# Patient Record
Sex: FEMALE | Race: WHITE | Employment: UNEMPLOYED | URBAN - METROPOLITAN AREA
[De-identification: names, ages, dates, MRNs, and addresses within clinical notes are randomized per-mention and may not be internally consistent; named-entity substitution may affect disease eponyms.]

---

## 2018-09-11 ENCOUNTER — DOCUMENTATION ONLY (OUTPATIENT)
Dept: INTERNAL MEDICINE CLINIC | Age: 38
End: 2018-09-11

## 2018-09-11 NOTE — PROGRESS NOTES
Spoke with pt on [id number]  Letting her know the first available for a new pt [facility] and she decide to wait to see dr valiente in October pt stated she is o.k with medication for her leg until then

## 2018-10-05 ENCOUNTER — TELEPHONE (OUTPATIENT)
Dept: INTERNAL MEDICINE CLINIC | Age: 38
End: 2018-10-05

## 2018-10-05 ENCOUNTER — OFFICE VISIT (OUTPATIENT)
Dept: INTERNAL MEDICINE CLINIC | Age: 38
End: 2018-10-05

## 2018-10-05 VITALS
HEIGHT: 64 IN | TEMPERATURE: 99.1 F | SYSTOLIC BLOOD PRESSURE: 134 MMHG | HEART RATE: 98 BPM | OXYGEN SATURATION: 97 % | DIASTOLIC BLOOD PRESSURE: 84 MMHG | WEIGHT: 214 LBS | RESPIRATION RATE: 15 BRPM | BODY MASS INDEX: 36.54 KG/M2

## 2018-10-05 DIAGNOSIS — I10 ESSENTIAL HYPERTENSION: Primary | ICD-10-CM

## 2018-10-05 DIAGNOSIS — Z01.419 VISIT FOR GYNECOLOGIC EXAMINATION: ICD-10-CM

## 2018-10-05 DIAGNOSIS — R60.0 LEG EDEMA, LEFT: ICD-10-CM

## 2018-10-05 DIAGNOSIS — J30.89 ENVIRONMENTAL AND SEASONAL ALLERGIES: ICD-10-CM

## 2018-10-05 RX ORDER — MINERAL OIL
180 ENEMA (ML) RECTAL DAILY
Qty: 90 TAB | Refills: 1 | Status: SHIPPED | OUTPATIENT
Start: 2018-10-05 | End: 2021-11-30 | Stop reason: ALTCHOICE

## 2018-10-05 RX ORDER — ACETAMINOPHEN AND CODEINE PHOSPHATE 120; 12 MG/5ML; MG/5ML
SOLUTION ORAL
COMMUNITY
End: 2018-10-05 | Stop reason: ALTCHOICE

## 2018-10-05 RX ORDER — NORETHINDRONE 0.35 MG/1
TABLET ORAL
COMMUNITY
Start: 2018-09-13

## 2018-10-05 RX ORDER — HYDROCHLOROTHIAZIDE 25 MG/1
25 TABLET ORAL DAILY
Qty: 90 TAB | Refills: 1 | Status: SHIPPED | OUTPATIENT
Start: 2018-10-05 | End: 2019-01-02 | Stop reason: SDUPTHER

## 2018-10-05 RX ORDER — ACETAMINOPHEN 500 MG
1000 TABLET ORAL
COMMUNITY
End: 2022-07-06

## 2018-10-05 NOTE — MR AVS SNAPSHOT
[address] [place] [address], [address] [place] [address] 
[phone] Patient: Katherine Burnett MRN: [id number] :1980 Visit Information Date & Time Provider Department Dept. Phone Encounter #  
 10[facility]5/2018 12:00 [phone] Follow-up Instructions Return in about 6 months (around 2019) for hypertension. Upcoming Health Maintenance Date Due DTaP/Tdap/Td series (1 - Tdap) 2003 PAP AKA CERVICAL CYTOLOGY 2003 Influenza Age 5 to Adult 3/31/2019* *Topic was postponed. The date shown is not the original due date. Allergies as of 10/5/2018  Review Complete On: 10/5/2018 By: Luis Curry Severity Noted Reaction Type Reactions Aspirin High 2010    Other (comments) Heart palpitations Ciprofloxacin High 2010    Rash Codeine High 2010    Nausea and Vomiting Kenalog [Triamcinolone Acetonide] High 2010    Anaphylaxis Zithromax [Azithromycin] High 2010    Rash Current Immunizations  Reviewed on 2015 Name Date Influenza Vaccine  Deferred (Patient Refused) Not reviewed this visit You Were Diagnosed With   
  
 Codes Comments Essential hypertension    -  Primary ICD-10-CM: I10 
ICD-9-CM: 401.9 Leg edema, left     ICD-10-CM: R60.0 ICD-9-CM: 162. 3 Vitals BP Pulse Temp Resp Height(growth percentile) Weight(growth percentile) 134/84 (BP 1 Location: Left arm, BP Patient Position: Sitting) 98 99.1 °F (37.3 °C) (Oral) 15 5' 4\" (1.626 m) 214 lb (97.1 kg) LMP SpO2 Breastfeeding? BMI OB Status Smoking Status (LMP Unknown) 97% No 36.73 kg/m2 Medically Induced Former Smoker Vitals History BMI and BSA Data Body Mass Index Body Surface Area  
 36.73 kg/m 2 2.09 m 2 Preferred Pharmacy Pharmacy Name Phone [facility] [phone] Your Updated Medication List  
  
   
This list is accurate as of 10/5/18  1:05 PM.  Always use your most recent med list.  
  
  
  
  
 fexofenadine 180 mg tablet Commonly known as:  Ronel Deter Take 1 Tab by mouth daily. * hydroCHLOROthiazide 25 mg tablet Commonly known as:  HYDRODIURIL Take 1 Tab by mouth daily. * hydroCHLOROthiazide 25 mg tablet Commonly known as:  HYDRODIURIL Take 1 Tab by mouth daily. CAROLA-BE 0.35 mg Tab Generic drug:  norethindrone Dr. Slime Louise, started 7/2018 TYLENOL EXTRA STRENGTH 500 mg tablet Generic drug:  acetaminophen Take 1,000 mg by mouth every six (6) hours as needed for Pain (At 4PM & 11PM). * Notice: This list has 2 medication(s) that are the same as other medications prescribed for you. Read the directions carefully, and ask your doctor or other care provider to review them with you. Prescriptions Sent to Pharmacy Refills  
 fexofenadine (ALLEGRA) 180 mg tablet 1 Sig: Take 1 Tab by mouth daily. Class: Normal  
 Pharmacy: [facility] Ph #: [phone] Route: Oral  
 hydroCHLOROthiazide (HYDRODIURIL) 25 mg tablet 1 Sig: Take 1 Tab by mouth daily. Class: Normal  
 Pharmacy: [facility] Ph #: [phone] Route: Oral  
 hydroCHLOROthiazide (HYDRODIURIL) 25 mg tablet 1 Sig: Take 1 Tab by mouth daily. Class: Normal  
 Pharmacy: [facility] Ph #: [phone] Route: Oral  
  
We Performed the Following CBC WITH AUTOMATED DIFF [70806 CPT(R)] LIPID PANEL [88596 CPT(R)] METABOLIC PANEL, COMPREHENSIVE [11331 CPT(R)] UA/M W/RFLX CULTURE, ROUTINE [[id number] Custom] Follow-up Instructions Return in about 6 months (around 4/5/2019) for hypertension. Introducing [facility]! [facility] introduces Zoyi patient portal. Now you can access parts of your medical record, email your doctor's office, and request medication refills online. 1. In your internet browser, go to [email or web address] 2. Click on the First Time User? Click Here link in the Sign In box. You will see the New Member Sign Up page. 3. Enter your Zoyi Access Code exactly as it appears below. You will not need to use this code after youve completed the sign-up process. If you do not sign up before the expiration date, you must request a new code. · Zoyi Access Code: [email or web address] Expires: 1/3/2019  1:05 PM 
 
4. Enter the last four digits of your Social Security Number (xxxx) and Date of Birth (mm/dd/yyyy) as indicated and click Submit. You will be taken to the next sign-up page. 5. Create a Zoyi ID. This will be your Zoyi login ID and cannot be changed, so think of one that is secure and easy to remember. 6. Create a Zoyi password. You can change your password at any time. 7. Enter your Password Reset Question and Answer. This can be used at a later time if you forget your password. 8. Enter your e-mail address. You will receive e-mail notification when new information is available in [address] 9. Click Sign Up. You can now view and download portions of your medical record. 10. Click the Download Summary menu link to download a portable copy of your medical information. If you have questions, please visit the Frequently Asked Questions section of the Zoyi website. Remember, Zoyi is NOT to be used for urgent needs. For medical emergencies, dial 911. Now available from your iPhone and Android! Please provide this summary of care documentation to your next provider. Your primary care clinician is listed as Serena Lanza. If you have any questions after today's visit, please call [phone].

## 2018-10-05 NOTE — TELEPHONE ENCOUNTER
Patient in office for visit today 10/5/18 with Dr. Dharmesh Gonzales, signed medical release form for [facility]. Fax sent, confirmation received, and placed to be scanned into chart. [facility]  Phone: [phone]  Fax: [phone]  No provider listed by patient.

## 2018-10-05 NOTE — PROGRESS NOTES
Subjective:  
  
Delmi Weber is a 30 y.o. female who presents today to become established. She has not seen a primary care provider in a year. Primary concern: -leg pain. She has had 2 DVT's in her right leg. Patient states that it was due to use of birth control pills. She has 9 children. 6 in her custody. She states that they all of a disability of some kind. She is getting her progesterone pills for birth control via a web site and interview with a NP online. She was seen in the ER at [facility] a few weeks ago for left leg pain and swelling. She was given hctz 25mg daily. She states that it has helped and it has also helped her control her blood pressure as well. She does not wear compression stockings nor does she watch the salt content in her diet. There is no regular exercise. Patient Active Problem List  
Diagnosis Code  Hypertension complicating pregnancy M22.9  No prenatal care O09.899, Z91.19  
 Previous DVT O22.30, I82.409  PROM (premature rupture of membranes) O42.90  
 Grand multipara Z64.1  Placenta previa antepartum in third trimester O44.03  
 
Current Outpatient Prescriptions Medication Sig Dispense Refill  CAROLA-BE 0.35 mg tab Dr. Aileen Hernandez, started 7/2018  acetaminophen (TYLENOL EXTRA STRENGTH) 500 mg tablet Take 1,000 mg by mouth every six (6) hours as needed for Pain (At 4PM & 11PM).  fexofenadine (ALLEGRA) 180 mg tablet Take 1 Tab by mouth daily. 90 Tab 1  
 hydroCHLOROthiazide (HYDRODIURIL) 25 mg tablet Take 1 Tab by mouth daily. 90 Tab 1  
 hydroCHLOROthiazide (HYDRODIURIL) 25 mg tablet Take 1 Tab by mouth daily. 90 Tab 1 Review of Systems Pertinent items are noted in HPI. Objective:  
 
Visit Vitals  /84 (BP 1 Location: Left arm, BP Patient Position: Sitting)  Pulse 98  Temp 99.1 °F (37.3 °C) (Oral)  Resp 15  Ht 5' 4\" (1.626 m)  Wt 214 lb (97.1 kg)  LMP  (LMP Unknown) Comment: OCPs  SpO2 97%  Breastfeeding No  
 BMI 36.73 kg/m2 General appearance: alert, cooperative, no distress, appears stated age Head: Normocephalic, without obvious abnormality, atraumatic Neck: supple, symmetrical, trachea midline, no adenopathy, no carotid bruit and no JVD Lungs: clear to auscultation bilaterally Heart: regular rate and rhythm, S1, S2 normal, no murmur, click, rub or gallop Abdomen: soft, non-tender. Bowel sounds normal. No masses,  no organomegaly Extremities: extremities normal, atraumatic, no cyanosis or edema Pulses: 2+ and symmetric Assessment/Plan: 1. Essential hypertension 
-will continue use of hctz 25mg daily. -encouraged a low salt diet.  
-check labs at this time. - CBC WITH AUTOMATED DIFF 
- METABOLIC PANEL, COMPREHENSIVE 
- LIPID PANEL 
- UA/M W/RFLX CULTURE, ROUTINE 2. Leg edema, left 
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 
-recommended she wear compression stockings daily.  
 
- METABOLIC PANEL, COMPREHENSIVE 
- UA/M W/RFLX CULTURE, ROUTINE 3. Visit for gynecologic examination 
-recommended she see a gynecologist for evaluation for best birth control for her .  
-referred patient to Dr. Saúl Atwood. [facility] ref [facility] 4. Seasonal allergies 
-refilled her allegra 180mg daily. Orders Placed This Encounter  CBC WITH AUTOMATED DIFF  
 METABOLIC PANEL, COMPREHENSIVE  LIPID PANEL  
 UA/M W/RFLX CULTURE, ROUTINE  
Behzad Rivera OB/GYN ref [facility] Referral Priority:   Routine Referral Type:   Consultation Referral Reason:   Specialty Services Required Referred to Provider:   Pascale Craft MD  
   
   
    
   
    
 fexofenadine TY [facility]) 180 mg tablet Sig: Take 1 Tab by mouth daily. Dispense:  90 Tab Refill:  1  
 hydroCHLOROthiazide (HYDRODIURIL) 25 mg tablet Sig: Take 1 Tab by mouth daily. Dispense:  90 Tab Refill:  1 Follow-up Disposition:  
 
Follow up in 6 months Return if symptoms worsen or fail to improve. Advised patient to call back or return to office if symptoms worsen/change/persist.  
 
Discussed expected course/resolution/complications of diagnosis in detail with patient. Medication risks/benefits/costs/interactions/alternatives discussed with patient. Patient was given an after visit summary which includes diagnoses, current medications, & vitals. Patient expressed understanding with the diagnosis and plan.

## 2018-10-05 NOTE — PROGRESS NOTES
Patient's identity verified with two patient identifiers (name and date of birth). Reviewed record in preparation for visit and have obtained necessary documentation. 1. Have you been to the ER, urgent care clinic since your last visit? Hospitalized since your last visit? Yes, [facility] ER x1mos ago for leg pain/swelling. 2. Have you seen or consulted any other health care providers outside of the [address] [place] [address] since your last visit? Include any pap smears or colon screening. Yes, see above. Chief Complaint Patient presents with  New Patient Prior PCP Dr. Jaqueline Myers, last seen x1yr.  Ear Pain Right ear, radiates into ear, x1wk, Orajel w/ relief & Tylenol but \"puts me to sleep\". Complains of pressure, worse at night, no change w/ laying down. Worse w/ noise, has 6 kids. Denies difficulty hearing. Does not use q-tips.  Knee Pain Right knee, x1mos d/t fall, bruising/swelling/some difficulty walking. Now w/ pain w/ standing or changing positions, denies pain w/ rest.  Complains of bilateral edema of legs, has \"looked like it was going to burst\". Takes HCTZ daily, used to take BID but no change. Not fasting. Health Maintenance Due Topic Date Due  
 DTaP/Tdap/Td series (1 - Tdap) Thinks has had 2014, [facility]. 12/17/2003  PAP AKA CERVICAL CYTOLOGY Patient reports has not had. 12/17/2003  Influenza Age 5 to Adult Patient reports has not had. \"Unable to get\", felt like she was going to fall out. 08/01/2018 Wt Readings from Last 3 Encounters:  
10/05/18 214 lb (97.1 kg) 05/14/15 232 lb (105.2 kg) 01/26/14 237 lb (107.5 kg) Temp Readings from Last 3 Encounters:  
10/05/18 99.1 °F (37.3 °C) (Oral) 05/24/15 98.3 °F (36.8 °C)  
01/29/14 98.1 °F (36.7 °C) BP Readings from Last 3 Encounters:  
10/05/18 134/84  
05/24/15 136/84  
01/29/14 153/88 Pulse Readings from Last 3 Encounters:  
10/05/18 98  
05/24/15 86  
01/29/14 84 Learning Assessment: 
:  
 
Learning Assessment 10/5/2018 PRIMARY LEARNER Patient HIGHEST LEVEL OF EDUCATION - PRIMARY LEARNER  SOME COLLEGE  
BARRIERS PRIMARY LEARNER NONE  
CO-LEARNER CAREGIVER No  
PRIMARY LANGUAGE ENGLISH  
LEARNER PREFERENCE PRIMARY DEMONSTRATION  
  LISTENING  
ANSWERED BY patient RELATIONSHIP SELF Depression Screening: 
:  
 
PHQ over the last two weeks 10/5/2018 Little interest or pleasure in doing things Not at all Feeling down, depressed, irritable, or hopeless More than half the days Total Score PHQ 2 2 Abuse Screening: 
:  
 
Abuse Screening Questionnaire 10/5/2018 Do you ever feel afraid of your partner? Wayne Distance Are you in a relationship with someone who physically or mentally threatens you? Wayne Distance Is it safe for you to go home? Reece Peñaloza Patient is accompanied by  and child (less than 4yo) I have received verbal consent from [employer] to discuss any/all medical information while they are present in the room.

## 2018-10-10 PROBLEM — I10 ESSENTIAL HYPERTENSION: Status: ACTIVE | Noted: 2018-10-10

## 2018-10-10 PROBLEM — I87.2 VENOUS INSUFFICIENCY OF LEFT LEG: Status: ACTIVE | Noted: 2018-10-10

## 2019-06-18 RX ORDER — HYDROCHLOROTHIAZIDE 25 MG/1
25 TABLET ORAL DAILY
Qty: 30 TAB | Refills: 0 | Status: SHIPPED | OUTPATIENT
Start: 2019-06-18 | End: 2019-08-01 | Stop reason: SDUPTHER

## 2019-07-16 RX ORDER — HYDROCHLOROTHIAZIDE 25 MG/1
25 TABLET ORAL DAILY
Qty: 30 TAB | Refills: 0 | OUTPATIENT
Start: 2019-07-16

## 2019-08-01 ENCOUNTER — OFFICE VISIT (OUTPATIENT)
Dept: INTERNAL MEDICINE CLINIC | Age: 39
End: 2019-08-01

## 2019-08-01 VITALS
HEIGHT: 64 IN | RESPIRATION RATE: 16 BRPM | BODY MASS INDEX: 37.32 KG/M2 | HEART RATE: 100 BPM | WEIGHT: 218.6 LBS | TEMPERATURE: 97.7 F | OXYGEN SATURATION: 99 % | SYSTOLIC BLOOD PRESSURE: 110 MMHG | DIASTOLIC BLOOD PRESSURE: 80 MMHG

## 2019-08-01 DIAGNOSIS — J02.9 ACUTE PHARYNGITIS, UNSPECIFIED ETIOLOGY: Primary | ICD-10-CM

## 2019-08-01 DIAGNOSIS — D64.9 ANEMIA, UNSPECIFIED TYPE: ICD-10-CM

## 2019-08-01 DIAGNOSIS — R00.0 TACHYCARDIA: ICD-10-CM

## 2019-08-01 DIAGNOSIS — R60.9 EDEMA, UNSPECIFIED TYPE: ICD-10-CM

## 2019-08-01 DIAGNOSIS — I87.2 VENOUS INSUFFICIENCY OF LEFT LEG: ICD-10-CM

## 2019-08-01 DIAGNOSIS — L67.8 ABNORMAL FACIAL HAIR: ICD-10-CM

## 2019-08-01 DIAGNOSIS — B07.0 PLANTAR WART OF LEFT FOOT: ICD-10-CM

## 2019-08-01 DIAGNOSIS — B37.2 CANDIDAL INTERTRIGO: ICD-10-CM

## 2019-08-01 RX ORDER — HYDROCHLOROTHIAZIDE 25 MG/1
25 TABLET ORAL DAILY
Qty: 30 TAB | Refills: 3 | Status: SHIPPED | OUTPATIENT
Start: 2019-08-01 | End: 2021-08-20 | Stop reason: DRUGHIGH

## 2019-08-01 RX ORDER — AMOXICILLIN 875 MG/1
875 TABLET, FILM COATED ORAL 2 TIMES DAILY
Qty: 14 TAB | Refills: 0 | Status: SHIPPED | OUTPATIENT
Start: 2019-08-01 | End: 2019-08-08

## 2019-08-01 RX ORDER — NYSTATIN 100000 [USP'U]/G
POWDER TOPICAL
Qty: 60 G | Refills: 3 | Status: SHIPPED | OUTPATIENT
Start: 2019-08-01 | End: 2021-07-27 | Stop reason: ALTCHOICE

## 2019-08-01 NOTE — PROGRESS NOTES
Chief Complaint   Patient presents with    Medication Refill    Other     right ear popping    Rash     under breast      Reviewed record in preparation for visit and have obtained necessary documentation. Identified pt with two pt identifiers(name and ). Health Maintenance Due   Topic    DTaP/Tdap/Td series (1 - Tdap)    PAP AKA CERVICAL CYTOLOGY     Influenza Age 5 to Adult          Chief Complaint   Patient presents with    Medication Refill    Other     right ear popping    Rash     under breast         Wt Readings from Last 3 Encounters:   19 218 lb 9.6 oz (99.2 kg)   10/05/18 214 lb (97.1 kg)   05/14/15 232 lb (105.2 kg)     Temp Readings from Last 3 Encounters:   19 97.7 °F (36.5 °C) (Oral)   10/05/18 99.1 °F (37.3 °C) (Oral)   05/24/15 98.3 °F (36.8 °C)     BP Readings from Last 3 Encounters:   19 110/80   10/05/18 134/84   05/24/15 136/84     Pulse Readings from Last 3 Encounters:   19 100   10/05/18 98   05/24/15 86           Learning Assessment:  :     Learning Assessment 10/5/2018   PRIMARY LEARNER Patient   HIGHEST LEVEL OF EDUCATION - PRIMARY LEARNER  SOME COLLEGE   BARRIERS PRIMARY LEARNER NONE   CO-LEARNER CAREGIVER No   PRIMARY LANGUAGE ENGLISH   LEARNER PREFERENCE PRIMARY DEMONSTRATION     LISTENING   ANSWERED BY patient   RELATIONSHIP SELF       Depression Screening:  :     3 most recent PHQ Screens 10/5/2018   Little interest or pleasure in doing things Not at all   Feeling down, depressed, irritable, or hopeless More than half the days   Total Score PHQ 2 2       Fall Risk Assessment:  :     No flowsheet data found. Abuse Screening:  :     Abuse Screening Questionnaire 10/5/2018   Do you ever feel afraid of your partner? N   Are you in a relationship with someone who physically or mentally threatens you? N   Is it safe for you to go home?  Y       Coordination of Care Questionnaire:  :     1) Have you been to an emergency room, urgent care clinic since your last visit? no   Hospitalized since your last visit? no             2) Have you seen or consulted any other health care providers outside of [address] [place] [address] since your last visit? yes  (Include any pap smears or colon screenings in this section.)    3) Do you have an Advance Directive on file? no    4) Are you interested in receiving information on Advance Directives? NO      Patient is accompanied by spouse I have received verbal consent from Glenny Vasquez to discuss any/all medical information while they are present in the room. Reviewed record  In preparation for visit and have obtained necessary documentation.

## 2019-08-01 NOTE — PROGRESS NOTES
42 yo female in for 6 mo HTN f/u and medication refill. She takes HCTZ, but takes this for LLE swelling. She also has a rash under both breasts, and is having popping in her R ear. She has a sore spot on bottom of L foot for months making it painful to weight bear. She has 6 children all of whom are intellectually disabled. They are ages 3 to 13. She feels very tired and sleepy during the day. 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 PE: Overweight WF accompanied by her    She has a significant amount of lower facial hair   Weight - 218 lb (down from 232 in 2015)   BP - 110/80   Phar - reg   Neck - L>R tonsillar node   Ears - TMs dull   Lungs - clear   Heart - RRR @ 100/M   Under breasts - redness   L foot - large plantar wart over base of 4th tarsal    Imp: Acute pharyngitis   Chronic LLE venous insufficiency   Tachycardia   Intertrigo   Plantar wart L foot   Facial hair    Plan: She will have labs drawn at [facility]   Amoxicillin, Mucinex and good hydration   Refill HCTZ   Nystatin powder   Suggested OTC tx for Plantar Wart (she will check with Pharmacist); she will let us know if she would like to see a Podiatrist   Suggest elevation of LEs when sitting   She is not a salt user   See Dr. Janessa Dietz in 6 mos   Encouraged her to establish with GYN provider as previously suggested by Dr. Janessa Dietz (she currently orders her OC through mail)  ________________________________  Expected course of current diagnosed problem(s) as well as expected progression and possible complications, and desired follow up with provider are discussed with patient. Patient is encouraged to be back in touch with any questions or concerns. Patient expresses understanding of plan of care. Patient is given AVS which includes diagnoses, current medications, vitals.

## 2019-08-01 NOTE — PATIENT INSTRUCTIONS
Plantar Wart treatment - topical salicylic acid - ask your pharmacist  ______________________________________    1. Mucinex (Guaifenesen) plain-Blue Box 600 mg. Take one twice daily with full glass water   Take for 10 days    2. Saline Nasal Spray - use liberally to flush post-nasal area; use as many times a day as desired. Keep spraying with head tilted back until you feel need to swallow    3. Drink lots of fluids (mainly water) to keep mucus thinner    4. If needed, for cough, we recommend Delsym cough syrup    5.  Decongestants should not be used as they actually contribute to overdrying/thickening of the mucus, and can raise the BP and overstimulate the heart

## 2021-07-27 ENCOUNTER — OFFICE VISIT (OUTPATIENT)
Dept: INTERNAL MEDICINE CLINIC | Age: 41
End: 2021-07-27
Payer: MEDICAID

## 2021-07-27 VITALS
DIASTOLIC BLOOD PRESSURE: 80 MMHG | WEIGHT: 212 LBS | RESPIRATION RATE: 16 BRPM | OXYGEN SATURATION: 99 % | TEMPERATURE: 98.2 F | BODY MASS INDEX: 36.19 KG/M2 | SYSTOLIC BLOOD PRESSURE: 143 MMHG | HEART RATE: 98 BPM | HEIGHT: 64 IN

## 2021-07-27 DIAGNOSIS — I10 ESSENTIAL HYPERTENSION: ICD-10-CM

## 2021-07-27 DIAGNOSIS — I87.2 VENOUS INSUFFICIENCY OF LEFT LEG: ICD-10-CM

## 2021-07-27 DIAGNOSIS — I82.409 DEEP VENOUS EMBOLISM AND THROMBOSIS (HCC): Primary | ICD-10-CM

## 2021-07-27 DIAGNOSIS — I72.8 SPLENIC ARTERY ANEURYSM (HCC): ICD-10-CM

## 2021-07-27 PROCEDURE — 99214 OFFICE O/P EST MOD 30 MIN: CPT | Performed by: INTERNAL MEDICINE

## 2021-07-27 RX ORDER — HYDROCHLOROTHIAZIDE 12.5 MG/1
12.5 TABLET ORAL DAILY
Qty: 30 TABLET | Refills: 1 | Status: SHIPPED | OUTPATIENT
Start: 2021-07-27 | End: 2021-08-20 | Stop reason: DRUGHIGH

## 2021-07-27 NOTE — PATIENT INSTRUCTIONS
Continue Eliquis 5 mg twice daily  Leg elevation  Compression hose   HCTZ 12.5 mg daily for blood pressure and swelling  Tylenol 2 tabs every 8 hours as needed for pain    Labs in 2 weeks  follow up in 2 weeks with Wayne Amaral MD

## 2021-07-27 NOTE — PROGRESS NOTES
Verified name and birth date for privacy precautions. Chart reviewed in preparation for today's visit. Chief Complaint   Patient presents with   [facility]aud[facility] ED Follow-up          Health Maintenance Due   Topic    Hepatitis C Screening     COVID-19 Vaccine (1)    DTaP/Tdap/Td series (1 - Tdap)    PAP AKA CERVICAL CYTOLOGY          Wt Readings from Last 3 Encounters:   07/27/21 212 lb (96.2 kg)   08/01/19 218 lb 9.6 oz (99.2 kg)   10/05/18 214 lb (97.1 kg)     Temp Readings from Last 3 Encounters:   07/27/21 98.2 °F (36.8 °C) (Temporal)   08/01/19 97.7 °F (36.5 °C) (Oral)   10/05/18 99.1 °F (37.3 °C) (Oral)     BP Readings from Last 3 Encounters:   07/27/21 (!) 143/80   08/01/19 110/80   10/05/18 134/84     Pulse Readings from Last 3 Encounters:   07/27/21 98   08/01/19 100   10/05/18 98         Learning Assessment:  :     Learning Assessment 8/1/2019 10/5/2018   PRIMARY LEARNER Patient Patient   HIGHEST LEVEL OF EDUCATION - PRIMARY LEARNER  SOME COLLEGE SOME COLLEGE   BARRIERS PRIMARY LEARNER NONE NONE   CO-LEARNER CAREGIVER - No   PRIMARY LANGUAGE ENGLISH ENGLISH   LEARNER PREFERENCE PRIMARY DEMONSTRATION DEMONSTRATION     - LISTENING   ANSWERED BY patient patient   RELATIONSHIP SELF SELF       Depression Screening:  :     3 most recent PHQ Screens 7/27/2021   Little interest or pleasure in doing things Not at all   Feeling down, depressed, irritable, or hopeless Not at all   Total Score PHQ 2 0       Fall Risk Assessment:  :     No flowsheet data found. Abuse Screening:  :     Abuse Screening Questionnaire 7/27/2021 8/1/2019 10/5/2018   Do you ever feel afraid of your partner? N N N   Are you in a relationship with someone who physically or mentally threatens you? N N N   Is it safe for you to go home?  Viki Mann

## 2021-07-27 NOTE — PROGRESS NOTES
Transition of Care   ER Evaluation: 7/22/21  [facility]  Discharge Diagnosis: DVT left, PE, splenic artery aneurysm     Patient is a 48 yo woman with a history of DVT of her left leg off anticoagulants, hypertension, venous insufficiency who was seen [facility] ER on 7/22/21 with right leg pain. Symptoms began 1-2 months ago after a fall, but RLE pain increased several days prior to admission. Venous Duplex showed acute occlusive DVT involving the right femoral vein, right posterior tibial veins, right peroneal veins and right gastrocnemius veins. 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 Primary aneurysm measures 1.8 cm. No evidence of leak. Previous  history of DVT left 2008, 2012   Patient was discharge on Eliquis 10 mg bid x 7 days, then 5 mg bid. Patient currently taking Progesterone only OCP  She continues to have RLE pain, but it is somewhat improved. She also complains o chronic LLE swelling by the end of the day. She has been advised to use compression hose, but could not afford the cost. She has not checked to see if she has coverage recently. Patient has taken HCTZ in the past, but has not taken x > 1 year. Patient denies shortness of breath, but does have some pain left anterior chest with deep inspiration. She denies cough or hemoptysis. Patient Active Problem List   Diagnosis Code    Previous DVT O22.30    Essential hypertension I10    Venous insufficiency of left leg I87.2     Current Outpatient Medications on File Prior to Visit   Medication Sig Dispense Refill    apixaban (Eliquis) 5 mg tablet Take 5 mg by mouth two (2) times a day.       CAROLA-BE 0.35 mg tab Dr. Alfredo Main, started 7/2018      acetaminophen (TYLENOL EXTRA STRENGTH) 500 mg tablet Take 1,000 mg by mouth every six (6) hours as needed for Pain (At 4PM & 11PM).      hydroCHLOROthiazide (HYDRODIURIL) 25 mg tablet Take 1 Tab by mouth daily. Need office visit for further refills 30 Tab 3    [DISCONTINUED] nystatin (MYCOSTATIN) powder After washing and drying involved areas, apply powder liberally 2-4 times a day until rash is gone (Patient not taking: Reported on 7/27/2021) 60 g 3    fexofenadine (ALLEGRA) 180 mg tablet Take 1 Tab by mouth daily. 90 Tab 1     No current facility-administered medications on file prior to visit. Visit Vitals  BP (!) 143/80 (BP 1 Location: Left arm, BP Patient Position: Sitting, BP Cuff Size: Large adult)   Pulse 98   Temp 98.2 °F (36.8 °C) (Temporal)   Resp 16   Ht 5' 4\" (1.626 m)   Wt 212 lb (96.2 kg)   LMP 07/20/2021   SpO2 99%   BMI 36.39 kg/m²     Patient is in no apparent distress   Heart[de-identified] normal rate, regular rhythm, normal S1, S2, no murmurs, rubs, clicks or gallops. Chest: clear to auscultation, no wheezes, rales or rhonchi,   Extremities: right calf tenderness, without erythema, 1+ bilateral edema    Assessment/Plan:    Deep vein thrombosis/Pulmonary Embolus, recurrent. Patient should remain on anticoagulation. Continue Eliquis 5 mg bid after she completes the 7 day 10 bid dosing. Edema/Venous insufficiency: HCTZ 12.5 mg every day  follow up BMP 2 weeks  Compression hose 20-30 mm prescription provided    HTN: as above HCTZ 12.5 every day. follow up Demetrice Gregg MD     Splenic artery aneurysm: will refer to Vascular Surgery for evaluation per Radiology recommendation.

## 2021-07-28 ENCOUNTER — TELEPHONE (OUTPATIENT)
Dept: INTERNAL MEDICINE CLINIC | Age: 41
End: 2021-07-28

## 2021-07-29 ENCOUNTER — TELEPHONE (OUTPATIENT)
Dept: INTERNAL MEDICINE CLINIC | Age: 41
End: 2021-07-29

## 2021-07-29 NOTE — TELEPHONE ENCOUNTER
7/29/2021 spoke with patient. - she was seen by Dr. Zelda Engel for Transition of Care . Was seen in [facility] ER for DVT. She was discharged with eliquis. She was also noted to have a splenic artery aneurysm and was referred to Vascular surgery. She states that she has an appointment to see Dr. Zuleika Li next week.     -she states that the hctz is making her sleepy. I recommended taking it at bedtime.    -she states compliance with use of her eliquis.

## 2021-07-29 NOTE — TELEPHONE ENCOUNTER
----- Message from Therese Pete MD sent at 7/27/2021  5:07 PM EDT -----  Regarding: abnormal CT Angiogram  Patient seen today for hospital follow up DVT/PE. Review of CTA from hospital record, after patient left, showed incidental splenic artery aneurysm. Radiology recommended Vascular Surgery evaluation. Please call patient and refer her to [facility] (Dr. Dee Chu, Dr. Ce Man).  (Patient is not on MyChart)    Thanks

## 2021-07-30 ENCOUNTER — TELEPHONE (OUTPATIENT)
Dept: INTERNAL MEDICINE CLINIC | Age: 41
End: 2021-07-30

## 2021-07-30 NOTE — TELEPHONE ENCOUNTER
Called patient advised that Dr. Kiara Del Rosario wanted to see her in 2 weeks, canceled her appt with NP, Olivia Benitez and scheduled her with Dr. Kiara Del Rosario on 8/20/21.

## 2021-07-30 NOTE — TELEPHONE ENCOUNTER
----- Message from Gabriel Taylor MD sent at 7/29/2021  4:49 PM EDT -----  Regarding: RE: abnormal CT Angiogram  Please switch her appointment with Blair Haas to with me in 2 weeks if possible. Thanks  Serena   ----- Message -----  From: Dave Lou  Sent: 7/29/2021   7:20 AM EDT  To: Gabriel Taylor MD, Karyn Sheth LPN  Subject: FW: abnormal CT Angiogram                        Tanner Wagner- has this pt been called- please see below? Also she has a fu in 1 month with Blair Haas, I think it is more appropriate for her to fu with Dr. Ced Sanchez, can you please make sure this is changed. Thank you  ----- Message -----  From: Stephane Malone MD  Sent: 7/27/2021   5:07 PM EDT  To: Gabriel Taylor MD, [facility] Three Pool  Subject: abnormal CT Angiogram                            Patient seen today for hospital follow up DVT/PE. Review of CTA from hospital record, after patient left, showed incidental splenic artery aneurysm. Radiology recommended Vascular Surgery evaluation. Please call patient and refer her to [facility] (Dr. Boris Quintero, Dr. Herberth Langley).  (Patient is not on MyChart)    Thanks

## 2021-07-30 NOTE — TELEPHONE ENCOUNTER
----- Message from Lei Navarro MD sent at 7/29/2021  4:49 PM EDT -----  Regarding: RE: abnormal CT Angiogram  Please switch her appointment with Rayray Stanley to with me in 2 weeks if possible. Thanks  Serena   ----- Message -----  From: Maxim Underwood  Sent: 7/29/2021   7:20 AM EDT  To: Lei Navarro MD, Buck Lacrosse, LPN  Subject: FW: abnormal CT Angiogram                        Tanner Wagner- has this pt been called- please see below? Also she has a fu in 1 month with Rayray Stanley, I think it is more appropriate for her to fu with Dr. Burak Keller, can you please make sure this is changed. Thank you  ----- Message -----  From: Moreno Swartz MD  Sent: 7/27/2021   5:07 PM EDT  To: Lei Navarro MD, Sravan Hamm Team Three Pool  Subject: abnormal CT Angiogram                            Patient seen today for hospital follow up DVT/PE. Review of CTA from hospital record, after patient left, showed incidental splenic artery aneurysm. Radiology recommended Vascular Surgery evaluation. Please call patient and refer [facility] to [facility] (Dr. Josiah Lau, Dr. Willene Sicard).  (Patient is not on MyChart)    Thanks

## 2021-08-04 ENCOUNTER — TRANSCRIBE ORDER (OUTPATIENT)
Dept: SCHEDULING | Age: 41
End: 2021-08-04

## 2021-08-04 DIAGNOSIS — I72.8 ANEURYSM OF SUBCLAVIAN ARTERY (HCC): Primary | ICD-10-CM

## 2021-08-20 ENCOUNTER — OFFICE VISIT (OUTPATIENT)
Dept: INTERNAL MEDICINE CLINIC | Age: 41
End: 2021-08-20
Payer: MEDICAID

## 2021-08-20 VITALS
TEMPERATURE: 97.5 F | RESPIRATION RATE: 16 BRPM | DIASTOLIC BLOOD PRESSURE: 83 MMHG | HEIGHT: 64 IN | WEIGHT: 222 LBS | OXYGEN SATURATION: 99 % | SYSTOLIC BLOOD PRESSURE: 130 MMHG | HEART RATE: 80 BPM | BODY MASS INDEX: 37.9 KG/M2

## 2021-08-20 DIAGNOSIS — Z86.718 HISTORY OF DVT OF LOWER EXTREMITY: ICD-10-CM

## 2021-08-20 DIAGNOSIS — Z86.711 HISTORY OF PULMONARY EMBOLISM: ICD-10-CM

## 2021-08-20 DIAGNOSIS — I87.2 VENOUS INSUFFICIENCY OF LEFT LEG: ICD-10-CM

## 2021-08-20 DIAGNOSIS — I10 ESSENTIAL HYPERTENSION: Primary | ICD-10-CM

## 2021-08-20 DIAGNOSIS — I72.8 ANEURYSM OF SPLENIC ARTERY (HCC): ICD-10-CM

## 2021-08-20 PROCEDURE — 99214 OFFICE O/P EST MOD 30 MIN: CPT | Performed by: INTERNAL MEDICINE

## 2021-08-20 RX ORDER — HYDROCHLOROTHIAZIDE 12.5 MG/1
12.5 TABLET ORAL DAILY
Qty: 30 TABLET | Refills: 5 | Status: SHIPPED | OUTPATIENT
Start: 2021-08-20 | End: 2021-10-05

## 2021-08-20 NOTE — PROGRESS NOTES
Verified name and birth date for privacy precautions. Chart reviewed in preparation for today's visit. Chief Complaint   Patient presents with    ED Follow-up     2nd follow up          Health Maintenance Due   Topic    Hepatitis C Screening     COVID-19 Vaccine (1)    DTaP/Tdap/Td series (1 - Tdap)    PAP AKA CERVICAL CYTOLOGY          Wt Readings from Last 3 Encounters:   08/20/21 222 lb (100.7 kg)   07/27/21 212 lb (96.2 kg)   08/01/19 218 lb 9.6 oz (99.2 kg)     Temp Readings from Last 3 Encounters:   08/20/21 97.5 °F (36.4 °C) (Temporal)   07/27/21 98.2 °F (36.8 °C) (Temporal)   08/01/19 97.7 °F (36.5 °C) (Oral)     BP Readings from Last 3 Encounters:   08/20/21 130/83   07/27/21 (!) 143/80   08/01/19 110/80     Pulse Readings from Last 3 Encounters:   08/20/21 80   07/27/21 98   08/01/19 100         Learning Assessment:  :     Learning Assessment 8/1/2019 10/5/2018   PRIMARY LEARNER Patient Patient   HIGHEST LEVEL OF EDUCATION - PRIMARY LEARNER  SOME COLLEGE SOME COLLEGE   BARRIERS PRIMARY LEARNER NONE NONE   CO-LEARNER CAREGIVER - No   PRIMARY LANGUAGE ENGLISH ENGLISH   LEARNER PREFERENCE PRIMARY DEMONSTRATION DEMONSTRATION     - LISTENING   ANSWERED BY patient patient   RELATIONSHIP SELF SELF       Depression Screening:  :     3 most recent PHQ Screens 8/20/2021   Little interest or pleasure in doing things Not at all   Feeling down, depressed, irritable, or hopeless Not at all   Total Score PHQ 2 0       Fall Risk Assessment:  :     No flowsheet data found. Abuse Screening:  :     Abuse Screening Questionnaire 8/20/2021 7/27/2021 8/1/2019 10/5/2018   Do you ever feel afraid of your partner? N N N N   Are you in a relationship with someone who physically or mentally threatens you? N N N N   Is it safe for you to go home?  Ting Magallanes

## 2021-08-20 NOTE — PROGRESS NOTES
Assessment/Plan:     1. Essential hypertension  -blood pressure is in control with hctz 12.5mg daily. Use of medication reviewed with patient. She is compliant with use. - CBC WITH AUTOMATED DIFF  - METABOLIC PANEL, COMPREHENSIVE  - LIPID PANEL    2. History of DVT of lower extremity  -recurrent  -anticoagulated on eliquis 5mg bid. Medication use reviewed with patient. She has been compliant with use. 3. History of pulmonary embolism  -anticoagulated now with eliquis 5mg bid. 4. Venous insufficiency of left leg  -encouraged her use of compression stockings daily. May take off when sleeping at night. 5. BMI 38.0-38.9,adult  -need to work on diet modification.     - HEMOGLOBIN A1C WITH EAG     6. Splenic aneurysm  -has been seen by vascular surgery. Will contact Dr. Titi Etienne for notes. Orders Placed This Encounter    CBC WITH AUTOMATED DIFF    METABOLIC PANEL, COMPREHENSIVE    LIPID PANEL    HEMOGLOBIN A1C WITH EAG    apixaban (Eliquis) 5 mg tablet     Sig: Take 1 Tablet by mouth two (2) times a day. Dispense:  60 Tablet     Refill:  5    hydroCHLOROthiazide (HYDRODIURIL) 12.5 mg tablet     Sig: Take 1 Tablet by mouth daily. Dispense:  30 Tablet     Refill:  5                         Follow-up Disposition:     Follow up 4 months              Subjective:      Walter Arellano is a 47 y.o. female who presents today for follow up of her hypertension, recurrent DVT, and PE. Since last visit:  -patient diagnosed with recurrent DVT in right femoral vein on 7/22/21. CTA also showed positive for PE. Patient was discharged on Eliquis. -hctz was restarted on 7/27/21 for hypertension and venous insufficiency. She had stopped this medication on her own prior.     -she also was referred to vascular surgery for evaluation of a splenic artery aneurysm. - had evaluation with Dr. Titi Etienne. She states that he ordered another CT of her abdomen and pelvis.   It is scheduled.     -taking her medications. She has been working on compliants with use.  -not wearing her compression stockings. Still has significant swelling in lower legs. Patient Care Team:  -Dr. Symone Johnson, vascular surgeon       Objective: Wt Readings from Last 3 Encounters:   07/27/21 212 lb (96.2 kg)   08/01/19 218 lb 9.6 oz (99.2 kg)   10/05/18 214 lb (97.1 kg)     BP Readings from Last 3 Encounters:   07/27/21 (!) 143/80   08/01/19 110/80   10/05/18 134/84     Visit Vitals  /83 (BP 1 Location: Left arm, BP Patient Position: Sitting, BP Cuff Size: Large adult)   Pulse 80   Temp 97.5 °F (36.4 °C) (Temporal)   Resp 16   Ht 5' 4\" (1.626 m)   Wt 222 lb (100.7 kg)   LMP 08/13/2021   SpO2 99%   BMI 38.11 kg/m²     General appearance: alert, cooperative, no distress, appears stated age  Head: Normocephalic, without obvious abnormality, atraumatic  Neck: supple, symmetrical, trachea midline, no adenopathy, no carotid bruit and no JVD  Lungs: clear to auscultation bilaterally  Heart: regular rate and rhythm, S1, S2 normal, no murmur, click, rub or gallop  Extremities: varicose veins noted, edema 2+ bilateral lower leg from ankle to midshin              Disclaimer:  Return if symptoms worsen or fail to improve. Advised patient to call back or return to office if symptoms worsen/change/persist.     Discussed expected course/resolution/complications of diagnosis in detail with patient. Medication risks/benefits/costs/interactions/alternatives discussed with patient. Patient was given an after visit summary which includes diagnoses, current medications, & vitals. Patient expressed understanding with the diagnosis and plan.

## 2021-08-21 LAB
ALBUMIN SERPL-MCNC: 4.4 G/DL (ref 3.8–4.8)
ALBUMIN/GLOB SERPL: 1.6 {RATIO} (ref 1.2–2.2)
ALP SERPL-CCNC: 112 IU/L (ref 48–121)
ALT SERPL-CCNC: 23 IU/L (ref 0–32)
AST SERPL-CCNC: 17 IU/L (ref 0–40)
BASOPHILS # BLD AUTO: 0.1 X10E3/UL (ref 0–0.2)
BASOPHILS NFR BLD AUTO: 1 %
BILIRUB SERPL-MCNC: 0.3 MG/DL (ref 0–1.2)
BUN SERPL-MCNC: 18 MG/DL (ref 6–20)
BUN/CREAT SERPL: 24 (ref 9–23)
CALCIUM SERPL-MCNC: 9.6 MG/DL (ref 8.7–10.2)
CHLORIDE SERPL-SCNC: 105 MMOL/L (ref 96–106)
CHOLEST SERPL-MCNC: 148 MG/DL (ref 100–199)
CO2 SERPL-SCNC: 26 MMOL/L (ref 20–29)
CREAT SERPL-MCNC: 0.74 MG/DL (ref 0.57–1)
EOSINOPHIL # BLD AUTO: 0.1 X10E3/UL (ref 0–0.4)
EOSINOPHIL NFR BLD AUTO: 1 %
ERYTHROCYTE [DISTWIDTH] IN BLOOD BY AUTOMATED COUNT: 12.7 % (ref 11.7–15.4)
EST. AVERAGE GLUCOSE BLD GHB EST-MCNC: 114 MG/DL
GLOBULIN SER CALC-MCNC: 2.8 G/DL (ref 1.5–4.5)
GLUCOSE SERPL-MCNC: 88 MG/DL (ref 65–99)
HBA1C MFR BLD: 5.6 % (ref 4.8–5.6)
HCT VFR BLD AUTO: 41.3 % (ref 34–46.6)
HDLC SERPL-MCNC: 49 MG/DL
HGB BLD-MCNC: 13.7 G/DL (ref 11.1–15.9)
IMM GRANULOCYTES # BLD AUTO: 0 X10E3/UL (ref 0–0.1)
IMM GRANULOCYTES NFR BLD AUTO: 0 %
LDLC SERPL CALC-MCNC: 90 MG/DL (ref 0–99)
LYMPHOCYTES # BLD AUTO: 1.3 X10E3/UL (ref 0.7–3.1)
LYMPHOCYTES NFR BLD AUTO: 19 %
MCH RBC QN AUTO: 28.5 PG (ref 26.6–33)
MCHC RBC AUTO-ENTMCNC: 33.2 G/DL (ref 31.5–35.7)
MCV RBC AUTO: 86 FL (ref 79–97)
MONOCYTES # BLD AUTO: 0.4 X10E3/UL (ref 0.1–0.9)
MONOCYTES NFR BLD AUTO: 6 %
NEUTROPHILS # BLD AUTO: 4.9 X10E3/UL (ref 1.4–7)
NEUTROPHILS NFR BLD AUTO: 73 %
PLATELET # BLD AUTO: 212 X10E3/UL (ref 150–450)
POTASSIUM SERPL-SCNC: 4.4 MMOL/L (ref 3.5–5.2)
PROT SERPL-MCNC: 7.2 G/DL (ref 6–8.5)
RBC # BLD AUTO: 4.8 X10E6/UL (ref 3.77–5.28)
SODIUM SERPL-SCNC: 144 MMOL/L (ref 134–144)
TRIGL SERPL-MCNC: 41 MG/DL (ref 0–149)
VLDLC SERPL CALC-MCNC: 9 MG/DL (ref 5–40)
WBC # BLD AUTO: 6.7 X10E3/UL (ref 3.4–10.8)

## 2021-09-09 ENCOUNTER — HOSPITAL ENCOUNTER (OUTPATIENT)
Dept: CT IMAGING | Age: 41
Discharge: HOME | End: 2021-09-09
Attending: SURGERY
Payer: MEDICAID

## 2021-09-09 DIAGNOSIS — I72.8 ANEURYSM OF SUBCLAVIAN ARTERY (HCC): ICD-10-CM

## 2021-09-09 PROCEDURE — 74174 CTA ABD&PLVS W/CONTRAST: CPT

## 2021-09-09 PROCEDURE — 74011000636 HC RX REV CODE- 636: Performed by: RADIOLOGY

## 2021-09-09 RX ADMIN — IOPAMIDOL 100 ML: 755 INJECTION, SOLUTION INTRAVENOUS at 20:36

## 2021-09-23 ENCOUNTER — TRANSCRIBE ORDER (OUTPATIENT)
Dept: REGISTRATION | Age: 41
End: 2021-09-23

## 2021-09-23 DIAGNOSIS — Z01.812 PRE-PROCEDURE LAB EXAM: Primary | ICD-10-CM

## 2021-11-03 ENCOUNTER — TRANSCRIBE ORDER (OUTPATIENT)
Dept: REGISTRATION | Age: 41
End: 2021-11-03

## 2021-11-03 DIAGNOSIS — Z01.812 PRE-PROCEDURE LAB EXAM: Primary | ICD-10-CM

## 2021-11-10 ENCOUNTER — TELEPHONE (OUTPATIENT)
Dept: INTERNAL MEDICINE CLINIC | Age: 41
End: 2021-11-10

## 2021-11-10 NOTE — TELEPHONE ENCOUNTER
Message from patient today, 11/10/2021 -     Comments:  Dr. Luh Roberts wants me to get 3 shots pneumonia, Meningitis, and Haemophilus influenza. My spleen is not damaged or being taken out. They are just doing embolization or stent graphing of the artery that goes to the spleen. Do I really need these shots. Also if I have to take them I did not do well with flu shot it made me want to fall out and I was out of it for 2 or 3 days. Would like an opinion on it and if I do have to take it. I rather take it at the office      Plan:  Discussed with patient that she should get the vaccines as recommended by Dr. Luh Roberts, vascular surgeon. 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 I informed her that there is a flu vaccine that is made without egg contact. She will contact her pharmacy for these immunizations.

## 2021-11-29 ENCOUNTER — HOSPITAL ENCOUNTER (OUTPATIENT)
Dept: PREADMISSION TESTING | Age: 41
Discharge: HOME | End: 2021-11-29

## 2021-11-30 ENCOUNTER — VIRTUAL VISIT (OUTPATIENT)
Dept: INTERNAL MEDICINE CLINIC | Age: 41
End: 2021-11-30
Payer: MEDICAID

## 2021-11-30 DIAGNOSIS — J06.9 VIRAL URI: Primary | ICD-10-CM

## 2021-11-30 DIAGNOSIS — R05.9 COUGH: ICD-10-CM

## 2021-11-30 DIAGNOSIS — R09.81 NASAL CONGESTION: ICD-10-CM

## 2021-11-30 PROCEDURE — 99213 OFFICE O/P EST LOW 20 MIN: CPT | Performed by: NURSE PRACTITIONER

## 2021-11-30 NOTE — PROGRESS NOTES
Kimo Stewart is a 47 y.o. female who was seen by synchronous (real-time) audio-video technology on 11/30/2021. Assessment & Plan:   Below is the assessment and plan developed based on review of pertinent history, physical exam (if applicable), labs, studies, and medications. 1. Viral URI  2. Nasal congestion  3. Cough    Advised covid and flu testing at urgent care  Hydrate  Add mucinex  Tylenol for chills/body aches  I spent at least 16 minutes on this visit with this established patient. ([id number])  Subjective:   Kimo Stewart was seen for Cough and Nasal Congestion      Patient reports cough and congestion x 5 days. She felt very fatigued the first 2 days, but that has improved. She notes post-nasal drip. She states everyone in her household has been experiencing similar symptoms. No one has been tested for covid or flu. She has not received vaccines for covid or flu. She reports chills at night, but tylenol helps. No known fever, but believes she gets one at night. Prior to Admission medications    Medication Sig Start Date End Date Taking? Authorizing Provider   hydroCHLOROthiazide (HYDRODIURIL) 12.5 mg tablet Take 1 tablet by mouth once daily 10/5/21   Willa Adam MD   apixaban (Eliquis) 5 mg tablet Take 1 Tablet by mouth two (2) times a day. 8/20/21   Willa Adam MD   Comp Stocking,Knee,Regular,Sml misc 1 Device by Does Not Apply route daily. Indications: leg swelling post blood clots 7/27/21   DislerMichael MD   CAROLA-BE 0.35 mg tab Dr. Judith Nunez, started 7/2018 9/13/18   Provider, Historical   acetaminophen (TYLENOL EXTRA STRENGTH) 500 mg tablet Take 1,000 mg by mouth every six (6) hours as needed for Pain (At 4PM & 11PM). Provider, Historical   fexofenadine (ALLEGRA) 180 mg tablet Take 1 Tab by mouth daily.  10/5/18   Willa dAam MD       Patient Active Problem List   Diagnosis Code    History of DVT of lower extremity Z86.718    Essential hypertension I10    Venous insufficiency of left leg I87.2    History of pulmonary embolism Z86.711     Patient Active Problem List    Diagnosis Date Noted    History of pulmonary embolism 2021    Essential hypertension 10/10/2018    Venous insufficiency of left leg 10/10/2018    History of DVT of lower extremity 2011     Current Outpatient Medications   Medication Sig Dispense Refill    hydroCHLOROthiazide (HYDRODIURIL) 12.5 mg tablet Take 1 tablet by mouth once daily 90 Tablet 0    apixaban (Eliquis) 5 mg tablet Take 1 Tablet by mouth two (2) times a day. 60 Tablet 5    Comp Stocking,Knee,Regular,Sml misc 1 Device by Does Not Apply route daily. Indications: leg swelling post blood clots 1 Device 2    CAROLA-BE 0.35 mg tab Dr. Cristal Bahena, started 2018      acetaminophen (TYLENOL EXTRA STRENGTH) 500 mg tablet Take 1,000 mg by mouth every six (6) hours as needed for Pain (At 4PM & 11PM).  fexofenadine (ALLEGRA) 180 mg tablet Take 1 Tab by mouth daily. 90 Tab 1     Allergies   Allergen Reactions    Aspirin Other (comments)     Heart palpitations    Ciprofloxacin Rash    Codeine Nausea and Vomiting    Kenalog [Triamcinolone Acetonide] Anaphylaxis    Zithromax [Azithromycin] Rash     Past Medical History:   Diagnosis Date    Deep vein blood clot of left lower extremity ([address] 2008    second 2012    HX OTHER MEDICAL     DVT in left leg in    [address]     no Prenatal Care this Pregnancy    Hypertension complicating pregnancy 7463    Ill-defined condition     cellulitis in bilateral legs    Phlebitis and thrombophlebitis of unspecified site     bilateral ankles    Pregnancy     7th child now,     Thromboembolus [facility])      Past Surgical History:   Procedure Laterality Date    HX  SECTION  2014        HX  SECTION  2015        HX GYN      Vaginal x4 (yrs: , 2005, 2006, 2007, , , )    HX OTHER SURGICAL      wisdom teeth removed at age 27.      Family History   Problem Relation Age of Onset    Mental Retardation Other    Floydene Ada Arthritis-rheumatoid Mother     Diabetes Mother     Alzheimer's Disease Mother     Hypertension Father     Heart Disease Father     Cancer Maternal Grandmother         breast     Social History     Tobacco Use    Smoking status: Former Smoker     Years: 0.50     Quit date: 2002     Years since quittin.5    Smokeless tobacco: Never Used   Substance Use Topics    Alcohol use: No       ROS - per HPI      Objective:   No flowsheet data found. General: alert, cooperative, no distress   Mental  status: normal mood, behavior, speech, dress, motor activity, and thought processes, able to follow commands   Eyes: EOM intact, normal sclera   Mouth: mucous membranes moist   Neck: no visualized mass   Resp: normal effort, no respiratory distress and coughing - dry   Neuro: no gross deficits   Musculoskeletal: normal ROM of neck and normal gait w/o ataxia   Skin: no discoloration or lesions of concern on visible areas   Psychiatric: normal affect, no hallucinations   Cough is sometimes productive  + nasal congestion    Aden Noyola, was evaluated through a synchronous (real-time) audio-video encounter. The patient (or guardian if applicable) is aware that this is a billable service. Verbal consent to proceed has been obtained within the past 12 months. The visit was conducted pursuant to the emergency declaration under the [facility], [address] [place] [address] authority and the [employer] General Act. Patient identification was verified, and a caregiver was present when appropriate. The patient was located in a state where the provider was credentialed to provide care.      Wilber Layne NP

## 2021-12-04 ENCOUNTER — NURSE TRIAGE (OUTPATIENT)
Dept: OTHER | Facility: CLINIC | Age: 41
End: 2021-12-04

## 2021-12-04 NOTE — TELEPHONE ENCOUNTER
Reason for Disposition   SEVERE or constant chest pain or pressure (Exception: mild central chest pain, present only when coughing)    Answer Assessment - Initial Assessment Questions  1. COVID-19 DIAGNOSIS: \"Who made your Coronavirus (COVID-19) diagnosis? \" \"Was it confirmed by a positive lab test?\" If not diagnosed by a HCP, ask \"Are there lots of cases (community spread) where you live? \" (See public health department website, if unsure)      Diagnosed on 11/3/21, symptoms have been present for about 2 weeks    2. COVID-19 EXPOSURE: \"Was there any known exposure to COVID before the symptoms began? \" [facility] Definition of close contact: within 6 feet (2 meters) for a total of 15 minutes or more over a 24-hour period. Patient positive    3. ONSET: \"When did the COVID-19 symptoms start? \"       See above    4. WORST SYMPTOM: \"What is your worst symptom? \" (e.g., cough, fever, shortness of breath, muscle aches)      Cough and feels light headed, like will pass out    5. COUGH: \"Do you have a cough? \" If Yes, ask: \"How bad is the cough? \"        Yes    6. FEVER: \"Do you have a fever? \" If Yes, ask: \"What is your temperature, how was it measured, and when did it start? \"      Denies    7. RESPIRATORY STATUS: \"Describe your breathing? \" (e.g., shortness of breath, wheezing, unable to speak)       Denies but is having constant chest pain    8. BETTER-SAME-WORSE: Charkathyan Ing you getting better, staying the same or getting worse compared to yesterday? \"  If getting worse, ask, \"In what way? \"      Worse    9. HIGH RISK DISEASE: \"Do you have any chronic medical problems? \" (e.g., asthma, heart or lung disease, weak immune system, obesity, etc.)      HTN, splenic artery aneurysm    10. PREGNANCY: \"Is there any chance you are pregnant? \" \"When was your last menstrual period? \"        Denies    11. OTHER SYMPTOMS: \"Do you have any other symptoms? \"  (e.g., chills, fatigue, headache, loss of smell or taste, muscle pain, sore throat; new loss of smell or taste especially support the diagnosis of COVID-19)        Decreased appetite, nausea- did not eat last night, has been drinking some clear fluids but states not drinking enough    Protocols used: COVID-19 - DIAGNOSED OR SUSPECTED-ADULT-AH    Received call from hospitals at [facility] with [employer] Complaint. Brief description of triage: Patient covid positive, experiencing new constant chest pain that is worse with coughing, nausea, decreased oral intake, and states she feels like she might pass out    Triage indicates for patient to Go to ED now    Care advice provided, patient verbalizes understanding; denies any other questions or concerns; instructed to call back for any new or worsening symptoms. Attention Provider: Thank you for allowing me to participate in the care of your patient. The patient was connected to triage in response to information provided to the ECC. Please do not respond through this encounter as the response is not directed to a shared pool.

## 2021-12-06 ENCOUNTER — TELEPHONE (OUTPATIENT)
Dept: INTERNAL MEDICINE CLINIC | Age: 41
End: 2021-12-06

## 2021-12-06 NOTE — TELEPHONE ENCOUNTER
Reason for call: Pt would like to discuss her med and interactions     Is this a new problem: yes     Date of last appointment:  11/30/2021     Can we respond via [employer]: no    Best call back number:[phone]

## 2021-12-06 NOTE — TELEPHONE ENCOUNTER
Patient states that she went to the hospital, and was prescribed methylprednisolone 4 mg. She wants to know is this medication, okay to take with her Eliquis and hydrochlorothiazide.

## 2021-12-07 NOTE — TELEPHONE ENCOUNTER
Spoke to the patient advised the per Dr. Vivek rhodes to take steroid with other medications. Patient states that she has stopped taking the medication, she was suppose to take up to 6 pills at a time. She was at 4 pills and she felt as if she could not walk and that her heart was coming out of her chest. Her cough is better but she does not want to feel that way.

## 2021-12-14 ENCOUNTER — VIRTUAL VISIT (OUTPATIENT)
Dept: INTERNAL MEDICINE CLINIC | Age: 41
End: 2021-12-14
Payer: MEDICAID

## 2021-12-14 DIAGNOSIS — E87.6 HYPOKALEMIA: ICD-10-CM

## 2021-12-14 DIAGNOSIS — U07.1 COVID-19: Primary | ICD-10-CM

## 2021-12-14 PROCEDURE — 99214 OFFICE O/P EST MOD 30 MIN: CPT | Performed by: INTERNAL MEDICINE

## 2021-12-14 NOTE — PROGRESS NOTES
Chief Complaint   Patient presents with    Cough     patient was positive with COvid tested on the 1st of December     Nasal Congestion       1. Have you been to the ER, urgent care clinic since your last visit? Hospitalized since your last visit? Yes When: 12/2021 Where: [address]  Reason for visit: Covid    2. Have you seen or consulted any other health care providers outside of the [address] [place] [address] since your last visit? Include any pap smears or colon screening.  No          Patient is ready for [facility] virtual visit

## 2021-12-14 NOTE — PROGRESS NOTES
Jer Rodriguez is a 47 y.o. female who was seen by synchronous (real-time) audio-video technology on 12/14/2021. Assessment & Plan:   Below is the assessment and plan developed based on review of pertinent history, physical exam (if applicable), labs, studies, and medications. Covid   Congestion  Flonase 2 sprays each nostril daily  Mucinex 2 tabs twice daily  Continue Tylenol as directed  Increase fluids  Call or return to clinic if these symptoms worsen or fail to improve as anticipated. Hypokalemia: repeat K+ 1 week    Follow-up Disposition:  Return if symptoms worsen or fail to improve. Advised to call back or return to office if symptoms worsen/change/persist.  Discussed expected course/resolution/complications of diagnosis in detail with patient. Medication risks/benefits/costs/interactions/alternatives discussed with patient. She was given an after visit summary which includes diagnoses, current medications, & vitals. She expressed understanding with the diagnosis and plan. Subjective:   Jer Rodriguez was seen for Cough (patient was positive with COvid tested on the 1st of December ) and Nasal Congestion  Patient and her 2 children have Covid. She currently reports having congestion, without fevers or shortness of breath. Over the last 2-3 days she has had intermittent  right ear pressure and some pain, improved with \"popping\" her ear. She has cough with clear sputum. She has been taking Tylenol for ear pain. .    Patient also reports that when she was seen at [facility] ER her potassium was low and they gave her oral supplement, but no prescription. Discussed repeating in 1 week and will mail order. Prior to Admission medications    Medication Sig Start Date End Date Taking? Authorizing Provider   hydroCHLOROthiazide (HYDRODIURIL) 12.5 mg tablet Take 1 tablet by mouth once daily 10/5/21  Yes Rylie Cervantes MD   apixaban (Eliquis) 5 mg tablet Take 1 Tablet by mouth two (2) times a day.  8/20/21  Yes Renu Ronquillo MD   Comp Stocking,Knee,Regular,Sml misc 1 Device by Does Not Apply route daily. Indications: leg swelling post blood clots 7/27/21  Yes Rj Hardy MD   CAROAL-BE 0.35 mg tab Dr. Alice Parmar, started 7/2018 9/13/18  Yes Provider, Historical   acetaminophen (TYLENOL EXTRA STRENGTH) 500 mg tablet Take 1,000 mg by mouth every six (6) hours as needed for Pain (At 4PM & 11PM). Yes Provider, Historical       Patient Active Problem List   Diagnosis Code    History of DVT of lower extremity Z86.718    Essential hypertension I10    Venous insufficiency of left leg I87.2    History of pulmonary embolism Z86.711     Current Outpatient Medications   Medication Sig Dispense Refill    hydroCHLOROthiazide (HYDRODIURIL) 12.5 mg tablet Take 1 tablet by mouth once daily 90 Tablet 0    apixaban (Eliquis) 5 mg tablet Take 1 Tablet by mouth two (2) times a day. 60 Tablet 5    Comp Stocking,Knee,Regular,Sml misc 1 Device by Does Not Apply route daily. Indications: leg swelling post blood clots 1 Device 2    CAROLA-BE 0.35 mg tab Dr. Alice Parmar, started 7/2018      acetaminophen (TYLENOL EXTRA STRENGTH) 500 mg tablet Take 1,000 mg by mouth every six (6) hours as needed for Pain (At 4PM & 11PM).        Allergies   Allergen Reactions    Aspirin Other (comments)     Heart palpitations    Ciprofloxacin Rash    Codeine Nausea and Vomiting    Kenalog [Triamcinolone Acetonide] Anaphylaxis    Zithromax [Azithromycin] Rash     Past Medical History:   Diagnosis Date    Deep vein blood clot of left lower extremity ([facility] [address] 2008    second 2012    HX OTHER MEDICAL     DVT in left leg in 2008   [address]     no Prenatal Care this Pregnancy    Hypertension complicating pregnancy 3/0/8496    Ill-defined condition     cellulitis in bilateral legs    Phlebitis and thrombophlebitis of unspecified site     bilateral ankles    Pregnancy     7th child now,     Thromboembolus ([facility] [address]        ROS - per HPI      Objective: Patient-Reported Vitals 12/14/2021   Patient-Reported Weight 232? Patient-Reported Height 5ft 4   Patient-Reported Temperature 98.6   Patient-Reported LMP 12/11/2021     General: alert, cooperative, no distress   Mental  status: normal mood, behavior, speech, dress, motor activity, and thought processes, able to follow commands   Eyes: EOM intact, normal sclera   Ears: no tenderness over tragus    Mouth: mucous membranes moist   Neck: no visualized mass   Resp: normal effort and no respiratory distress, occasional coughing   Neuro: no gross deficits           Psychiatric: normal affect, no hallucinations     Evorn Lapping, was evaluated through a synchronous (real-time) audio-video encounter. The patient (or guardian if applicable) is aware that this is a billable service. Verbal consent to proceed has been obtained within the past 12 months. The visit was conducted pursuant to the emergency declaration under the [address] [place] [address] authority and the [employer] and [employer] General Act. Patient identification was verified, and a caregiver was present when appropriate. The patient was located in a state where the provider was credentialed to provide care.      Maile Nguyen MD

## 2021-12-14 NOTE — PATIENT INSTRUCTIONS
Flonase 2 sprays each nostril daily  Mucinex 2 tabs twice daily  Continue Tylenol as directed  Increase fluids  Call or return to clinic if these symptoms worsen or fail to improve as anticipated. Repeat your potassium level in 1 week. We will mail you your lab slip.

## 2021-12-21 ENCOUNTER — VIRTUAL VISIT (OUTPATIENT)
Dept: INTERNAL MEDICINE CLINIC | Age: 41
End: 2021-12-21
Payer: MEDICAID

## 2021-12-21 DIAGNOSIS — U07.1 COVID-19: Primary | ICD-10-CM

## 2021-12-21 DIAGNOSIS — R09.81 HEAD CONGESTION: ICD-10-CM

## 2021-12-21 PROCEDURE — 99213 OFFICE O/P EST LOW 20 MIN: CPT | Performed by: INTERNAL MEDICINE

## 2021-12-21 RX ORDER — AZELASTINE HCL 205.5 UG/1
SPRAY NASAL 2 TIMES DAILY
COMMUNITY
End: 2022-07-06

## 2021-12-21 NOTE — PROGRESS NOTES
Felecia Cedeno is a 46 y.o. female who was seen by synchronous (real-time) audio-video technology on 12/21/2021. She confirmed that, for purposes of billing, this is a virtual visit with her provider for which we will submit a claim for reimbursement to her insurance company. She is aware that she will be responsible for any copays, coinsurance amounts or other amounts not covered by her insurance company. Do you accept - YES    This visit was completed was completed virtually using [employer]      Subjective:   Felecia Cedeno was seen for Nasal Congestion and Cough      -was diagnosed with COVID on 12/1/2021. Feeling better, but still has drainage in the morning and in the evening and head congestion in the morning and evening. She is not using flonase that was recommended. Only using saline nasal spray. No fever. No shortness of breath. Prior to Admission medications    Medication Sig Start Date End Date Taking? Authorizing Provider   guaifenesin/dextromethorphan (ROBITUSSIN-DM PO) Take  by mouth. Yes Provider, Historical   azelastine (ASTEPRO) 205.5 mcg (0.15 %) two (2) times a day. Yes Provider, Historical   hydroCHLOROthiazide (HYDRODIURIL) 12.5 mg tablet Take 1 tablet by mouth once daily 10/5/21  Yes Hernan Helton MD   apixaban (Eliquis) 5 mg tablet Take 1 Tablet by mouth two (2) times a day. 8/20/21  Yes Hernan Helton MD   Comp Stocking,Knee,Regular,Sml misc 1 Device by Does Not Apply route daily. Indications: leg swelling post blood clots 7/27/21  Yes Daniele Domínguez MD   CAROLA-BE 0.35 mg tab Dr. Papa Brennan, started 7/2018 9/13/18  Yes Provider, Historical   acetaminophen (TYLENOL EXTRA STRENGTH) 500 mg tablet Take 1,000 mg by mouth every six (6) hours as needed for Pain (At 4PM & 11PM).    Yes Provider, Historical       Allergies   Allergen Reactions    Aspirin Other (comments)     Heart palpitations    Ciprofloxacin Rash    Codeine Nausea and Vomiting    Kenalog [Triamcinolone Acetonide] Anaphylaxis  Zithromax [Azithromycin] Rash         ROS - per HPI      Objective:     General: alert, cooperative, no distress   Mental  status: pe mental status_general use: normal mood, behavior, speech, dress, motor activity, and thought processes, able to follow commands   Eyes: EOM intact, normal sclera   Mouth: not examined   Neck: no visualized mass   Resp: PULM - obs findings: normal effort and no respiratory distress   Neuro: neuro - obs: no gross deficits   Musculoskeletal: normal ROM of neck   Skin: skin exam: no discoloration or lesions of concern on visible areas   Psychiatric: normal affect, no hallucinations       Assessment & Plan:   1. COVID-19  2. Head congestion    Plan:  -symptoms likely due to lingering symptoms from recent covid infection. She is not immunized. Recommended she use flonase 2 sprays each nostril in the morning and zyrtec 10mg in the evening. Recommended hydration. Need to use both for next 6-8 weeks. CPT Codes 20896-10695 for Established Patients may apply to this Telehealth Visit  Time-based coding, delete if not needed: I spent at least 15 minutes with this established patient, and >50% of the time was spent counseling and/or coordinating care regarding congestion    Due to this being a TeleHealth evaluation, many elements of the physical examination are unable to be assessed. Pursuant to the emergency declaration under the [facility][note 3][facility], [facility] waiver authority and the [employer] Act, this Virtual  Visit was conducted, with patient's consent, to reduce the patient's risk of exposure to COVID-19 and provide continuity of care for an established patient. Services were provided through a video synchronous discussion virtually to substitute for in-person clinic visit. We discussed the expected course, resolution and complications of the diagnosis(es) in detail.   Medication risks, benefits, costs, interactions, and alternatives were discussed as indicated. I advised her to contact the office if her condition worsens, changes or fails to improve as anticipated. She expressed understanding with the diagnosis(es) and plan.      Mariya Kimball MD

## 2022-03-18 PROBLEM — Z86.711 HISTORY OF PULMONARY EMBOLISM: Status: ACTIVE | Noted: 2021-08-20

## 2022-03-19 PROBLEM — I87.2 VENOUS INSUFFICIENCY OF LEFT LEG: Status: ACTIVE | Noted: 2018-10-10

## 2022-03-20 PROBLEM — I10 ESSENTIAL HYPERTENSION: Status: ACTIVE | Noted: 2018-10-10

## 2022-06-19 ENCOUNTER — PATIENT MESSAGE (OUTPATIENT)
Dept: INTERNAL MEDICINE CLINIC | Age: 42
End: 2022-06-19

## 2022-06-20 RX ORDER — HYDROCHLOROTHIAZIDE 12.5 MG/1
12.5 TABLET ORAL DAILY
Qty: 30 TABLET | Refills: 0 | Status: SHIPPED | OUTPATIENT
Start: 2022-06-20 | End: 2022-07-21 | Stop reason: SDUPTHER

## 2022-06-20 NOTE — TELEPHONE ENCOUNTER
From: Mariah Smith   To: Milena Cox MD  Sent: 6/19/2022 11:31 PM EDT  Subject: Refills    I sent a request for the Aluqiggocabfhgqhzhl05.5 mg TAB. I am out of refills. I only have one pill left. I was wondering if you could refill that for me.

## 2022-06-22 ENCOUNTER — TELEPHONE (OUTPATIENT)
Dept: INTERNAL MEDICINE CLINIC | Age: 42
End: 2022-06-22

## 2022-06-22 NOTE — TELEPHONE ENCOUNTER
Patient requesting the script for Hydrochlorothiazide that was sent to [facility] [address] be re-sent to [facility] on [phone] is in the shop and this one is closer.

## 2022-07-05 NOTE — PROGRESS NOTES
Assessment/Plan:     1. Essential hypertension  -continue current dose of hctz 12.5mg daily.     - CBC WITH AUTOMATED DIFF  - METABOLIC PANEL, COMPREHENSIVE  - LIPID PANEL    2. Splenic artery aneurysm [facility])  -patient reports she is meeting with vascular surgeon tomorrow for discussion of treatment. 3. History of DVT of lower extremity  -has occasional cramping  -encouraged use of compression stockings. 4. History of pulmonary embolism  -anticoagulated with use of eliquis. Not taking appropriately  -encouraged patient to follow directions of use and to take her eliquis bid to reduce her risk of recurrence of DVT and PE    5. Encounter for long-term (current) use of medications      6. Morbid obesity ([address]  -has gained 24 pounds in the past year.  -encouraged dietary adjustments - maintain low fat diet.  -encouraged regular exercise daily.     - HEMOGLOBIN A1C WITH EAG    7. Uses birth control  -taking norethindrone to reduce risk of recurrent dvt. Has been buying medication online. Has not had routine gyn care in several years. -referred to gyn     - Katrin Cuevas    8. Screening for viral disease    - HEPATITIS C AB     Orders Placed This Encounter    CBC WITH AUTOMATED DIFF    METABOLIC PANEL, COMPREHENSIVE    LIPID PANEL    HEPATITIS C AB    HEMOGLOBIN A1C WITH EAG   Dandre Alan OB/GYN [facility] [employer] [facility]     Referral Priority:   Routine     Referral Type:   Consultation     Referral Reason:   Specialty Services Required     Referred to Provider:   Renny Guevara MD     Number of Visits Requested:   1    apixaban (Eliquis) 5 mg tablet     Sig: Take 1 Tablet by mouth two (2) times a day. Dispense:  180 Tablet     Refill:  1        Follow-up Disposition:     Follow up in 6 months               Subjective:      Gayla Lloyd is a 46 y.o. female who presents today for follow up of her hypertension, splenic aneurysm, and history of dvt with PE on anticoagulation.      Since last visit was virtual for COVID infection on 12/21/2021.:    -has splenic aneurysm and was scheduled with Dr. Contreras Monday for repair in 12/2021, but the procedure was cancelled. She had evaluation with Dr. Neal Irby, vascular surgeon, in 8/2021. CTA done on 8/4/2021 showed fusiform splenic artery aneurys 23 x 15 mm in size. No dissection.     -has history of DVT - 2008 and 7/2021with PE in 7/2021. She is suppose to be taking eliquis 5mg bid. Last refilled on 8/20/2021 for 6 months. -she got second opinion at [facility] who recommended splenectomy. She did not like this option. She appointment with vascular surgery associates tomorrow to discuss treatment.        -taking birth control pill, Nor-B,  getting pills from the internet. Has not had gyn exam in several years.     -history of dvt and pe - only taking eliquis once daily. She is aware that she should be taking it twice daily. Not wearing compression stockings. Has cramping at night intermittently in left lower leg.     -she has 9 children. 6 kids with her and 3 children living with her 's aunt. Both she and her  do not work. Patient Care Team:  -vascular surgeons. Objective:      Wt Readings from Last 3 Encounters:   08/20/21 222 lb (100.7 kg)   07/27/21 212 lb (96.2 kg)   08/01/19 218 lb 9.6 oz (99.2 kg)     BP Readings from Last 3 Encounters:   08/20/21 130/83   07/27/21 (!) 143/80   08/01/19 110/80     Visit Vitals  /89 (BP 1 Location: Left arm, BP Patient Position: Sitting, BP Cuff Size: Adult)   Pulse 82   Temp 97.5 °F (36.4 °C) (Temporal)   Resp 16   Ht 5' 4\" (1.626 m)   Wt 234 lb (106.1 kg)   LMP 06/06/2022   SpO2 98%   BMI 40.17 kg/m²     General appearance: alert, cooperative, no distress, appears stated age  Head: Normocephalic, without obvious abnormality, atraumatic  Neck: supple, symmetrical, trachea midline, no adenopathy, thyroid: not enlarged, symmetric, no tenderness/mass/nodules, no carotid bruit and no JVD  Lungs: clear to auscultation bilaterally  Heart: regular rate and rhythm, S1, S2 normal, no murmur, click, rub or gallop  Extremities: no edema              Disclaimer:  Return if symptoms worsen or fail to improve. Advised patient to call back or return to office if symptoms worsen/change/persist.     Discussed expected course/resolution/complications of diagnosis in detail with patient. Medication risks/benefits/costs/interactions/alternatives discussed with patient. Patient was given an after visit summary which includes diagnoses, current medications, & vitals. Patient expressed understanding with the diagnosis and plan.

## 2022-07-06 ENCOUNTER — OFFICE VISIT (OUTPATIENT)
Dept: INTERNAL MEDICINE CLINIC | Age: 42
End: 2022-07-06
Payer: MEDICAID

## 2022-07-06 ENCOUNTER — TELEPHONE (OUTPATIENT)
Dept: INTERNAL MEDICINE CLINIC | Age: 42
End: 2022-07-06

## 2022-07-06 VITALS
HEART RATE: 82 BPM | WEIGHT: 234 LBS | OXYGEN SATURATION: 98 % | TEMPERATURE: 97.5 F | HEIGHT: 64 IN | RESPIRATION RATE: 16 BRPM | SYSTOLIC BLOOD PRESSURE: 132 MMHG | BODY MASS INDEX: 39.95 KG/M2 | DIASTOLIC BLOOD PRESSURE: 89 MMHG

## 2022-07-06 DIAGNOSIS — I10 ESSENTIAL HYPERTENSION: Primary | ICD-10-CM

## 2022-07-06 DIAGNOSIS — Z86.718 HISTORY OF DVT OF LOWER EXTREMITY: ICD-10-CM

## 2022-07-06 DIAGNOSIS — Z78.9 USES BIRTH CONTROL: ICD-10-CM

## 2022-07-06 DIAGNOSIS — Z86.711 HISTORY OF PULMONARY EMBOLISM: ICD-10-CM

## 2022-07-06 DIAGNOSIS — Z11.59 SCREENING FOR VIRAL DISEASE: ICD-10-CM

## 2022-07-06 DIAGNOSIS — E66.01 MORBID OBESITY (HCC): ICD-10-CM

## 2022-07-06 DIAGNOSIS — I72.8 SPLENIC ARTERY ANEURYSM (HCC): ICD-10-CM

## 2022-07-06 DIAGNOSIS — Z79.899 ENCOUNTER FOR LONG-TERM (CURRENT) USE OF MEDICATIONS: ICD-10-CM

## 2022-07-06 PROCEDURE — 99214 OFFICE O/P EST MOD 30 MIN: CPT | Performed by: INTERNAL MEDICINE

## 2022-07-06 NOTE — PROGRESS NOTES
Verified name and birth date for privacy precautions. Chart reviewed in preparation for today's visit. Chief Complaint   Patient presents with    Hypertension    Chest Pain     a few days last week     Anxiety     when she is around people without mask     Leg Pain     cramping in legs ar night           Health Maintenance Due   Topic    Hepatitis C Screening     COVID-19 Vaccine (1)    DTaP/Tdap/Td series (1 - Tdap)    Cervical cancer screen          Wt Readings from Last 3 Encounters:   07/06/22 234 lb (106.1 kg)   08/20/21 222 lb (100.7 kg)   07/27/21 212 lb (96.2 kg)     Temp Readings from Last 3 Encounters:   07/06/22 97.5 °F (36.4 °C) (Temporal)   08/20/21 97.5 °F (36.4 °C) (Temporal)   07/27/21 98.2 °F (36.8 °C) (Temporal)     BP Readings from Last 3 Encounters:   07/06/22 132/89   08/20/21 130/83   07/27/21 (!) 143/80     Pulse Readings from Last 3 Encounters:   07/06/22 82   08/20/21 80   07/27/21 98         Learning Assessment:  :     Learning Assessment 8/1/2019 10/5/2018   PRIMARY LEARNER Patient Patient   HIGHEST LEVEL OF EDUCATION - PRIMARY LEARNER  SOME COLLEGE SOME COLLEGE   BARRIERS PRIMARY LEARNER NONE NONE   CO-LEARNER CAREGIVER - No   PRIMARY LANGUAGE ENGLISH ENGLISH   LEARNER PREFERENCE PRIMARY DEMONSTRATION DEMONSTRATION     - LISTENING   ANSWERED BY patient patient   RELATIONSHIP SELF SELF       Depression Screening:  :     3 most recent PHQ Screens 7/6/2022   Little interest or pleasure in doing things Not at all   Feeling down, depressed, irritable, or hopeless Not at all   Total Score PHQ 2 0       Fall Risk Assessment:  :     No flowsheet data found. Abuse Screening:  :     Abuse Screening Questionnaire 7/6/2022 8/20/2021 7/27/2021 8/1/2019 10/5/2018   Do you ever feel afraid of your partner? N N N N N   Are you in a relationship with someone who physically or mentally threatens you? N N N N N   Is it safe for you to go home?  Cardell Duane

## 2022-07-07 LAB
ALBUMIN SERPL-MCNC: 4.5 G/DL (ref 3.8–4.8)
ALBUMIN/GLOB SERPL: 1.8 {RATIO} (ref 1.2–2.2)
ALP SERPL-CCNC: 85 IU/L (ref 44–121)
ALT SERPL-CCNC: 13 IU/L (ref 0–32)
AST SERPL-CCNC: 11 IU/L (ref 0–40)
BASOPHILS # BLD AUTO: 0 X10E3/UL (ref 0–0.2)
BASOPHILS NFR BLD AUTO: 1 %
BILIRUB SERPL-MCNC: 0.4 MG/DL (ref 0–1.2)
BUN SERPL-MCNC: 17 MG/DL (ref 6–20)
BUN/CREAT SERPL: 22 (ref 9–23)
CALCIUM SERPL-MCNC: 9.6 MG/DL (ref 8.7–10.2)
CHLORIDE SERPL-SCNC: 103 MMOL/L (ref 96–106)
CHOLEST SERPL-MCNC: 148 MG/DL (ref 100–199)
CO2 SERPL-SCNC: 25 MMOL/L (ref 20–29)
CREAT SERPL-MCNC: 0.78 MG/DL (ref 0.57–1)
EGFR: 99 ML/MIN/1.73
EOSINOPHIL # BLD AUTO: 0.1 X10E3/UL (ref 0–0.4)
EOSINOPHIL NFR BLD AUTO: 1 %
ERYTHROCYTE [DISTWIDTH] IN BLOOD BY AUTOMATED COUNT: 13 % (ref 11.7–15.4)
EST. AVERAGE GLUCOSE BLD GHB EST-MCNC: 105 MG/DL
GLOBULIN SER CALC-MCNC: 2.5 G/DL (ref 1.5–4.5)
GLUCOSE SERPL-MCNC: 87 MG/DL (ref 65–99)
HBA1C MFR BLD: 5.3 % (ref 4.8–5.6)
HCT VFR BLD AUTO: 40.9 % (ref 34–46.6)
HCV AB S/CO SERPL IA: 0.2 S/CO RATIO (ref 0–0.9)
HDLC SERPL-MCNC: 47 MG/DL
HGB BLD-MCNC: 13.7 G/DL (ref 11.1–15.9)
IMM GRANULOCYTES # BLD AUTO: 0 X10E3/UL (ref 0–0.1)
IMM GRANULOCYTES NFR BLD AUTO: 0 %
LDLC SERPL CALC-MCNC: 89 MG/DL (ref 0–99)
LYMPHOCYTES # BLD AUTO: 1.5 X10E3/UL (ref 0.7–3.1)
LYMPHOCYTES NFR BLD AUTO: 23 %
MCH RBC QN AUTO: 29.3 PG (ref 26.6–33)
MCHC RBC AUTO-ENTMCNC: 33.5 G/DL (ref 31.5–35.7)
MCV RBC AUTO: 87 FL (ref 79–97)
MONOCYTES # BLD AUTO: 0.4 X10E3/UL (ref 0.1–0.9)
MONOCYTES NFR BLD AUTO: 7 %
NEUTROPHILS # BLD AUTO: 4.3 X10E3/UL (ref 1.4–7)
NEUTROPHILS NFR BLD AUTO: 68 %
PLATELET # BLD AUTO: 233 X10E3/UL (ref 150–450)
POTASSIUM SERPL-SCNC: 4.4 MMOL/L (ref 3.5–5.2)
PROT SERPL-MCNC: 7 G/DL (ref 6–8.5)
RBC # BLD AUTO: 4.68 X10E6/UL (ref 3.77–5.28)
SODIUM SERPL-SCNC: 142 MMOL/L (ref 134–144)
TRIGL SERPL-MCNC: 56 MG/DL (ref 0–149)
VLDLC SERPL CALC-MCNC: 12 MG/DL (ref 5–40)
WBC # BLD AUTO: 6.3 X10E3/UL (ref 3.4–10.8)

## 2022-07-11 NOTE — TELEPHONE ENCOUNTER
Faxed record request to Dr. Apryl Kramer for pt's last office notes report at fax number [phone].

## 2022-07-25 RX ORDER — HYDROCHLOROTHIAZIDE 12.5 MG/1
12.5 TABLET ORAL DAILY
Qty: 90 TABLET | Refills: 1 | Status: SHIPPED | OUTPATIENT
Start: 2022-07-25

## 2022-09-16 ENCOUNTER — OFFICE VISIT (OUTPATIENT)
Dept: INTERNAL MEDICINE CLINIC | Age: 42
End: 2022-09-16
Payer: MEDICAID

## 2022-09-16 VITALS
WEIGHT: 237.6 LBS | HEIGHT: 64 IN | DIASTOLIC BLOOD PRESSURE: 85 MMHG | HEART RATE: 82 BPM | RESPIRATION RATE: 16 BRPM | BODY MASS INDEX: 40.56 KG/M2 | SYSTOLIC BLOOD PRESSURE: 130 MMHG | OXYGEN SATURATION: 100 % | TEMPERATURE: 98.4 F

## 2022-09-16 DIAGNOSIS — R60.0 BILATERAL LOWER EXTREMITY EDEMA: ICD-10-CM

## 2022-09-16 DIAGNOSIS — R45.0 JITTERY FEELING: Primary | ICD-10-CM

## 2022-09-16 PROCEDURE — 99213 OFFICE O/P EST LOW 20 MIN: CPT | Performed by: NURSE PRACTITIONER

## 2022-09-16 NOTE — PROGRESS NOTES
HISTORY OF PRESENT ILLNESS  Dave Ortega is a 46 y.o. female. Patient reports tingling in feet x 4-5 days. She feels shaky in the morning and it worsens during the day. No lightheadedness, chest pain, or SOB. She reports she is taking care of six children so she has a lot of stress. Patient has upcoming procedure with Dr. Gage Miller for splenic artery aneurysm on 10/4, which also adds stress. Patient has chronic bilateral lower extremity edema and does not have compression stockings. Visit Vitals  /85 (BP 1 Location: Left upper arm, BP Patient Position: Sitting, BP Cuff Size: Large adult)   Pulse 82   Temp 98.4 °F (36.9 °C) (Temporal)   Resp 16   Ht 5' 4\" (1.626 m)   Wt 237 lb 9.6 oz (107.8 kg)   LMP 09/01/2022 (Exact Date)   SpO2 100%   BMI 40.78 kg/m²       HPI    Review of Systems   Cardiovascular:  Positive for leg swelling. Neurological:  Positive for tingling. Physical Exam  Constitutional:       Appearance: She is obese. Cardiovascular:      Rate and Rhythm: Normal rate and regular rhythm. Pulmonary:      Effort: Pulmonary effort is normal.      Breath sounds: Normal breath sounds. Musculoskeletal:      Right lower leg: Edema present. Left lower leg: Edema (mod pitting bilaterally) present. Skin:     General: Skin is warm and dry. Neurological:      General: No focal deficit present. Mental Status: She is alert and oriented to person, place, and time. Psychiatric:         Mood and Affect: Mood normal.         Behavior: Behavior normal.     ASSESSMENT and PLAN    ICD-10-CM ICD-9-CM    1. Jittery feeling  R45.0 799.21 CBC WITH AUTOMATED DIFF      METABOLIC PANEL, COMPREHENSIVE      TSH 3RD GENERATION      CBC WITH AUTOMATED DIFF      METABOLIC PANEL, COMPREHENSIVE      TSH 3RD GENERATION      2.  Bilateral lower extremity edema  R60.0 782.3 CBC WITH AUTOMATED DIFF      METABOLIC PANEL, COMPREHENSIVE      TSH 3RD GENERATION      CBC WITH AUTOMATED DIFF      METABOLIC PANEL, COMPREHENSIVE      TSH 3RD GENERATION      Labs ordered  Compression stockings ordered

## 2022-09-17 LAB
ALBUMIN SERPL-MCNC: 5.2 G/DL (ref 3.8–4.8)
ALBUMIN/GLOB SERPL: 2.5 {RATIO} (ref 1.2–2.2)
ALP SERPL-CCNC: 100 IU/L (ref 44–121)
ALT SERPL-CCNC: 18 IU/L (ref 0–32)
AST SERPL-CCNC: 16 IU/L (ref 0–40)
BASOPHILS # BLD AUTO: 0 X10E3/UL (ref 0–0.2)
BASOPHILS NFR BLD AUTO: 1 %
BILIRUB SERPL-MCNC: 0.7 MG/DL (ref 0–1.2)
BUN SERPL-MCNC: 14 MG/DL (ref 6–20)
BUN/CREAT SERPL: 19 (ref 9–23)
CALCIUM SERPL-MCNC: 10.1 MG/DL (ref 8.7–10.2)
CHLORIDE SERPL-SCNC: 99 MMOL/L (ref 96–106)
CO2 SERPL-SCNC: 27 MMOL/L (ref 20–29)
CREAT SERPL-MCNC: 0.73 MG/DL (ref 0.57–1)
EGFR: 107 ML/MIN/1.73
EOSINOPHIL # BLD AUTO: 0.1 X10E3/UL (ref 0–0.4)
EOSINOPHIL NFR BLD AUTO: 1 %
ERYTHROCYTE [DISTWIDTH] IN BLOOD BY AUTOMATED COUNT: 11.9 % (ref 11.7–15.4)
GLOBULIN SER CALC-MCNC: 2.1 G/DL (ref 1.5–4.5)
GLUCOSE SERPL-MCNC: 86 MG/DL (ref 65–99)
HCT VFR BLD AUTO: 43.1 % (ref 34–46.6)
HGB BLD-MCNC: 14 G/DL (ref 11.1–15.9)
IMM GRANULOCYTES # BLD AUTO: 0 X10E3/UL (ref 0–0.1)
IMM GRANULOCYTES NFR BLD AUTO: 0 %
LYMPHOCYTES # BLD AUTO: 1.5 X10E3/UL (ref 0.7–3.1)
LYMPHOCYTES NFR BLD AUTO: 22 %
MCH RBC QN AUTO: 28.2 PG (ref 26.6–33)
MCHC RBC AUTO-ENTMCNC: 32.5 G/DL (ref 31.5–35.7)
MCV RBC AUTO: 87 FL (ref 79–97)
MONOCYTES # BLD AUTO: 0.5 X10E3/UL (ref 0.1–0.9)
MONOCYTES NFR BLD AUTO: 7 %
NEUTROPHILS # BLD AUTO: 4.8 X10E3/UL (ref 1.4–7)
NEUTROPHILS NFR BLD AUTO: 69 %
PLATELET # BLD AUTO: 275 X10E3/UL (ref 150–450)
POTASSIUM SERPL-SCNC: 4 MMOL/L (ref 3.5–5.2)
PROT SERPL-MCNC: 7.3 G/DL (ref 6–8.5)
RBC # BLD AUTO: 4.97 X10E6/UL (ref 3.77–5.28)
SODIUM SERPL-SCNC: 139 MMOL/L (ref 134–144)
TSH SERPL DL<=0.005 MIU/L-ACNC: 10.9 UIU/ML (ref 0.45–4.5)
WBC # BLD AUTO: 7 X10E3/UL (ref 3.4–10.8)

## 2022-09-19 ENCOUNTER — TELEPHONE (OUTPATIENT)
Dept: INTERNAL MEDICINE CLINIC | Age: 42
End: 2022-09-19

## 2022-09-19 DIAGNOSIS — E03.9 HYPOTHYROIDISM, ADULT: Primary | ICD-10-CM

## 2022-09-19 DIAGNOSIS — Z86.718 HISTORY OF DVT OF LOWER EXTREMITY: Primary | ICD-10-CM

## 2022-09-19 DIAGNOSIS — R60.0 BILATERAL LOWER EXTREMITY EDEMA: ICD-10-CM

## 2022-09-19 DIAGNOSIS — E03.9 HYPOTHYROIDISM, ADULT: ICD-10-CM

## 2022-09-19 RX ORDER — LEVOTHYROXINE SODIUM 50 UG/1
50 TABLET ORAL
Qty: 90 TABLET | Refills: 0 | Status: SHIPPED | OUTPATIENT
Start: 2022-09-19

## 2022-09-19 NOTE — TELEPHONE ENCOUNTER
Spoke with patient 9/19/2022     -will fax order for compression stocking due to chronic lower extremity edema and history of recurrent DVT. -patient also notified that her TSH is elevated. Her mother had thyroid disease as does her 15year old daughter. Recommended start of levothyroxine 50mcg daily. Discussed she needs to take this on an empty stomach with water and to wait at least 30 minutes before she takes her other medications or eating     she states understanding and agrees with plan. Will mail lab order for her to have her TSH rechecked in 6-8 weeks.

## 2022-09-19 NOTE — TELEPHONE ENCOUNTER
----- Message from Wendy Waters LPN sent at 0/33/3469 10:23 AM EDT -----  Regarding: FW: Need new prescription!    ----- Message -----  From: Trip Calderon  Sent: 9/70/8327  10:07 AM EDT  To: Anabella Paniagua [place]  Subject: Need new prescription! Went to [facility] but they dont submit the prescription to [employer]. Found a pharmacy that can do it. [facility] which they gave me the fax number for the compression stockings which is [phone]. The lady I talked to said the prescription needs to have the strength which she told me there are 5 different ones and also the diagnostics code for the compression stockings. I was wondering if you could send a new prescription to [address] [place] [address] with the strength and diagnostic code for the compression stockings. I have an appointment with them for Thursday at 9:00 to fit them.

## 2022-12-12 ENCOUNTER — OFFICE VISIT (OUTPATIENT)
Dept: INTERNAL MEDICINE CLINIC | Age: 42
End: 2022-12-12
Payer: MEDICAID

## 2022-12-12 VITALS
TEMPERATURE: 98.8 F | SYSTOLIC BLOOD PRESSURE: 119 MMHG | OXYGEN SATURATION: 98 % | RESPIRATION RATE: 16 BRPM | HEIGHT: 64 IN | WEIGHT: 242 LBS | HEART RATE: 90 BPM | DIASTOLIC BLOOD PRESSURE: 86 MMHG | BODY MASS INDEX: 41.32 KG/M2

## 2022-12-12 DIAGNOSIS — H66.92 LEFT OTITIS MEDIA, UNSPECIFIED OTITIS MEDIA TYPE: Primary | ICD-10-CM

## 2022-12-12 PROCEDURE — 99213 OFFICE O/P EST LOW 20 MIN: CPT | Performed by: INTERNAL MEDICINE

## 2022-12-12 RX ORDER — AMOXICILLIN AND CLAVULANATE POTASSIUM 875; 125 MG/1; MG/1
1 TABLET, FILM COATED ORAL EVERY 12 HOURS
Qty: 14 TABLET | Refills: 0 | Status: SHIPPED | OUTPATIENT
Start: 2022-12-12 | End: 2022-12-19

## 2022-12-12 NOTE — PROGRESS NOTES
Verified name and birth date for privacy precautions. Chart reviewed in preparation for today's visit. Chief Complaint   Patient presents with    Cough     Flu 2 weeks ago still has cough, chest congestion and sinus pain           Health Maintenance Due   Topic    COVID-19 Vaccine (1)    DTaP/Tdap/Td series (1 - Tdap)    Cervical cancer screen     Flu Vaccine (1)         Wt Readings from Last 3 Encounters:   12/12/22 242 lb (109.8 kg)   09/16/22 237 lb 9.6 oz (107.8 kg)   07/06/22 234 lb (106.1 kg)     Temp Readings from Last 3 Encounters:   12/12/22 98.8 °F (37.1 °C) (Temporal)   09/16/22 98.4 °F (36.9 °C) (Temporal)   07/06/22 97.5 °F (36.4 °C) (Temporal)     BP Readings from Last 3 Encounters:   12/12/22 119/86   09/16/22 130/85   07/06/22 132/89     Pulse Readings from Last 3 Encounters:   12/12/22 90   09/16/22 82   07/06/22 82         Learning Assessment:  :     Learning Assessment 8/1/2019 10/5/2018   PRIMARY LEARNER Patient Patient   HIGHEST LEVEL OF EDUCATION - PRIMARY LEARNER  SOME COLLEGE SOME COLLEGE   BARRIERS PRIMARY LEARNER NONE NONE   CO-LEARNER CAREGIVER - No   PRIMARY LANGUAGE ENGLISH ENGLISH   LEARNER PREFERENCE PRIMARY DEMONSTRATION DEMONSTRATION     - LISTENING   ANSWERED BY patient patient   RELATIONSHIP SELF SELF       Depression Screening:  :     3 most recent PHQ Screens 12/12/2022   Little interest or pleasure in doing things Not at all   Feeling down, depressed, irritable, or hopeless Not at all   Total Score PHQ 2 0       Fall Risk Assessment:  :     No flowsheet data found. Abuse Screening:  :     Abuse Screening Questionnaire 12/12/2022 7/6/2022 8/20/2021 7/27/2021 8/1/2019 10/5/2018   Do you ever feel afraid of your partner? N N N N N N   Are you in a relationship with someone who physically or mentally threatens you? N N N N N N   Is it safe for you to go home?  Marvin Bound

## 2022-12-12 NOTE — PROGRESS NOTES
Acute Care Note    Valentina Law is 46 y.o. female. she presents for evaluation of Cough (Flu 2 weeks ago still has cough, chest congestion and sinus pain )      Children sick with flu A two weeks ago. She developed fevers and cough. We discussed that she has had resolution of the fevers but notes that the cough remains. She denies shortness of breath. The cough is non-productive. She also has some left ear discomfort. She notes that this feels \"stuffy\". Prior to Admission medications    Medication Sig Start Date End Date Taking? Authorizing Provider   hydroCHLOROthiazide (HYDRODIURIL) 12.5 mg tablet Take 1 Tablet by mouth in the morning. 7/25/22  Yes Serena Lanza MD   apixaban (Eliquis) 5 mg tablet Take 1 Tablet by mouth two (2) times a day. 7/6/22  Yes Hernan Helton MD   CAROLA-BE 0.35 mg tab Dr. Papa Brennan, started 7/2018 9/13/18  Yes Provider, Historical   OTHER Compression stockings  Knee high  20-30mmHg  ICD Z86.718. R60.0 9/19/22   Hernan Helton MD   levothyroxine (SYNTHROID) 50 mcg tablet Take 1 Tablet by mouth Daily (before breakfast). Patient not taking: Reported on 12/12/2022 9/19/22   Hernan Helton MD   compr.stocking,knee,long,large (Comp Stocking,Knee,Long,Large) misc 1 Units by Does Not Apply route daily. 9/16/22   Garfield Epley, NP         Patient Active Problem List   Diagnosis Code    History of DVT of lower extremity Z86.718    Essential hypertension I10    Venous insufficiency of left leg I87.2    History of pulmonary embolism Z86.711    Splenic artery aneurysm (HCC) I72.8         Review of Systems   Constitutional: Negative. Respiratory:  Positive for cough. Visit Vitals  /86   Pulse 90   Temp 98.8 °F (37.1 °C) (Temporal)   Resp 16   Ht 5' 4\" (1.626 m)   Wt 242 lb (109.8 kg)   LMP 11/15/2022   SpO2 98%   BMI 41.54 kg/m²       Physical Exam  HENT:      Ears:      Comments: Inflamed TM on the left. ASSESSMENT/PLAN  Diagnoses and all orders for this visit:    1.  Left otitis media, unspecified otitis media type  -     amoxicillin-clavulanate (AUGMENTIN) 875-125 mg per tablet; Take 1 Tablet by mouth every twelve (12) hours for 7 days. Advised the patient to call back or return to office if symptoms worsen/change/persist.   Discussed expected course/resolution/complications of diagnosis in detail with patient. Medication risks/benefits/costs/interactions/alternatives discussed with patient. The patient was given an after visit summary which includes diagnoses, current medications, & vitals. They expressed understanding with the diagnosis and plan.

## 2022-12-15 RX ORDER — LEVOTHYROXINE SODIUM 50 UG/1
50 TABLET ORAL
Qty: 90 TABLET | Refills: 0 | Status: SHIPPED | OUTPATIENT
Start: 2022-12-15

## 2023-01-12 ENCOUNTER — OFFICE VISIT (OUTPATIENT)
Dept: INTERNAL MEDICINE CLINIC | Age: 43
End: 2023-01-12
Payer: MEDICAID

## 2023-01-12 VITALS
DIASTOLIC BLOOD PRESSURE: 83 MMHG | SYSTOLIC BLOOD PRESSURE: 123 MMHG | HEIGHT: 64 IN | OXYGEN SATURATION: 98 % | RESPIRATION RATE: 16 BRPM | BODY MASS INDEX: 41.66 KG/M2 | TEMPERATURE: 98 F | WEIGHT: 244 LBS | HEART RATE: 91 BPM

## 2023-01-12 DIAGNOSIS — Z79.899 ENCOUNTER FOR LONG-TERM (CURRENT) USE OF MEDICATIONS: ICD-10-CM

## 2023-01-12 DIAGNOSIS — I72.8 SPLENIC ARTERY ANEURYSM (HCC): ICD-10-CM

## 2023-01-12 DIAGNOSIS — E03.9 HYPOTHYROIDISM, ADULT: Primary | ICD-10-CM

## 2023-01-12 DIAGNOSIS — I10 ESSENTIAL HYPERTENSION: ICD-10-CM

## 2023-01-12 DIAGNOSIS — Z01.419 ENCOUNTER FOR GYNECOLOGICAL EXAMINATION WITHOUT ABNORMAL FINDING: ICD-10-CM

## 2023-01-12 PROCEDURE — 99214 OFFICE O/P EST MOD 30 MIN: CPT | Performed by: INTERNAL MEDICINE

## 2023-01-12 NOTE — PROGRESS NOTES
Verified name and birth date for privacy precautions. Chart reviewed in preparation for today's visit. Chief Complaint   Patient presents with    Medication Evaluation          Health Maintenance Due   Topic    COVID-19 Vaccine (1)    DTaP/Tdap/Td series (1 - Tdap)    Cervical cancer screen     Flu Vaccine (1)         Wt Readings from Last 3 Encounters:   01/12/23 244 lb (110.7 kg)   12/12/22 242 lb (109.8 kg)   09/16/22 237 lb 9.6 oz (107.8 kg)     Temp Readings from Last 3 Encounters:   01/12/23 98 °F (36.7 °C) (Temporal)   12/12/22 98.8 °F (37.1 °C) (Temporal)   09/16/22 98.4 °F (36.9 °C) (Temporal)     BP Readings from Last 3 Encounters:   01/12/23 123/83   12/12/22 119/86   09/16/22 130/85     Pulse Readings from Last 3 Encounters:   01/12/23 91   12/12/22 90   09/16/22 82         Learning Assessment:  :     Learning Assessment 8/1/2019 10/5/2018   PRIMARY LEARNER Patient Patient   HIGHEST LEVEL OF EDUCATION - PRIMARY LEARNER  SOME COLLEGE SOME COLLEGE   BARRIERS PRIMARY LEARNER NONE NONE   CO-LEARNER CAREGIVER - No   PRIMARY LANGUAGE ENGLISH ENGLISH   LEARNER PREFERENCE PRIMARY DEMONSTRATION DEMONSTRATION     - LISTENING   ANSWERED BY patient patient   RELATIONSHIP SELF SELF       Depression Screening:  :     3 most recent PHQ Screens 1/12/2023   Little interest or pleasure in doing things Not at all   Feeling down, depressed, irritable, or hopeless Not at all   Total Score PHQ 2 0       Fall Risk Assessment:  :     No flowsheet data found. Abuse Screening:  :     Abuse Screening Questionnaire 1/12/2023 12/12/2022 7/6/2022 8/20/2021 7/27/2021 8/1/2019 10/5/2018   Do you ever feel afraid of your partner? N N N N N N N   Are you in a relationship with someone who physically or mentally threatens you? N N N N N N N   Is it safe for you to go home?  Luzmaria Robert

## 2023-01-12 NOTE — PROGRESS NOTES
Assessment/Plan:     1. Hypothyroidism, adult  -has restarted her levothyroxine 50mcg daily about 4 weeks ago. She has missed a few doses. -encouraged compliance with use of medication.  -lab slip given for her to have her TSH rechecked in the first week of February, 2023.     2. Essential hypertension  -controlled with use of hctz 12.5mg daily    3. Encounter for long-term (current) use of medications      4. Encounter for gynecological examination without abnormal finding  -in review of her chart, she had an appointment with Dr. Karen Nunez. Carlotta Cedillo, in October, 2022 , but she did not show up for her appointment.   -strongly encouraged her to see gyn for cervical cancer screening and revaluation for continued use of ocp    - REFERRAL TO OBSTETRICS AND GYNECOLOGY     5. Spenic aneurysm  -strongly encouraged patient to follow up with Dr. Roney Joseph This Encounter    Carlotta Cedillo OB/[facility] EMPL     Referral Priority:   Routine     Referral Type:   Consultation     Referral Reason:   Specialty Services Required     Referred to Provider:   Debora Mercado MD     Number of Visits Requested:   1        Follow-up Disposition:                   Subjective:      Tanika Carmona is a 38 y.o. female who presents today for follow up of her hypertension, hypothyroid and history of PE. Since last visit :  -scheduled for mesenteric angiogram with Dr. Maggie Britt, in November, 2022. She cancelled, but has not resceduled. Cancelled because several of her children and she were sick. The angiogram was for evaluation of her splenic aneurysm.     -she stopped taking her thyroid medication for 2 weeks last month due to URI. Has restarted medication around 12/15/2022.     -out of birth control pills. Need refill. She had been getting her ocp from an online source, but she states they would not refill without a visit and she has not had time for follow up. She has not seen a gynecologist for an exam in some time.       Patient Care Team:   Funmilayo, vascular surgeon - splenic aneurysm. Due for:  -declines all immunizations       Objective: Wt Readings from Last 3 Encounters:   01/12/23 244 lb (110.7 kg)   12/12/22 242 lb (109.8 kg)   09/16/22 237 lb 9.6 oz (107.8 kg)     BP Readings from Last 3 Encounters:   01/12/23 123/83   12/12/22 119/86   09/16/22 130/85     Visit Vitals  /83   Pulse 91   Temp 98 °F (36.7 °C) (Temporal)   Resp 16   Ht 5' 4\" (1.626 m)   Wt 244 lb (110.7 kg)   SpO2 98%   BMI 41.88 kg/m²     General appearance: alert, cooperative, no distress, appears stated age  Head: Normocephalic, without obvious abnormality, atraumatic  Neck: supple, symmetrical, trachea midline, no adenopathy, thyroid: not enlarged, symmetric, no tenderness/mass/nodules, no carotid bruit, and no JVD  Lungs: clear to auscultation bilaterally  Heart: regular rate and rhythm, S1, S2 normal, no murmur, click, rub or gallop              Disclaimer:  Return if symptoms worsen or fail to improve. Advised patient to call back or return to office if symptoms worsen/change/persist.     Discussed expected course/resolution/complications of diagnosis in detail with patient. Medication risks/benefits/costs/interactions/alternatives discussed with patient. Patient was given an after visit summary which includes diagnoses, current medications, & vitals. Patient expressed understanding with the diagnosis and plan.

## 2023-01-26 RX ORDER — HYDROCHLOROTHIAZIDE 12.5 MG/1
12.5 TABLET ORAL DAILY
Qty: 90 TABLET | Refills: 1 | Status: SHIPPED | OUTPATIENT
Start: 2023-01-26

## 2023-06-08 ENCOUNTER — HOSPITAL ENCOUNTER (OUTPATIENT)
Age: 43
Discharge: HOME | End: 2023-06-08
Payer: MEDICAID

## 2023-06-08 DIAGNOSIS — S99.922A FOOT TRAUMA, LEFT, INITIAL ENCOUNTER: ICD-10-CM

## 2023-06-08 PROCEDURE — 73630 X-RAY EXAM OF FOOT: CPT

## 2023-09-20 ENCOUNTER — TELEMEDICINE (OUTPATIENT)
Age: 43
End: 2023-09-20
Payer: MEDICAID

## 2023-09-20 DIAGNOSIS — E03.9 HYPOTHYROIDISM, UNSPECIFIED TYPE: Primary | ICD-10-CM

## 2023-09-20 DIAGNOSIS — H93.8X1 SENSATION OF FULLNESS IN RIGHT EAR: ICD-10-CM

## 2023-09-20 DIAGNOSIS — R05.1 ACUTE COUGH: ICD-10-CM

## 2023-09-20 DIAGNOSIS — I10 ESSENTIAL (PRIMARY) HYPERTENSION: ICD-10-CM

## 2023-09-20 DIAGNOSIS — E03.9 HYPOTHYROIDISM, UNSPECIFIED TYPE: ICD-10-CM

## 2023-09-20 DIAGNOSIS — Z86.16 HISTORY OF COVID-19: ICD-10-CM

## 2023-09-20 PROCEDURE — 99214 OFFICE O/P EST MOD 30 MIN: CPT | Performed by: NURSE PRACTITIONER

## 2023-09-20 RX ORDER — MONTELUKAST SODIUM 10 MG/1
10 TABLET ORAL NIGHTLY
Qty: 30 TABLET | Refills: 1 | Status: SHIPPED | OUTPATIENT
Start: 2023-09-20

## 2023-09-20 RX ORDER — FLUTICASONE PROPIONATE 50 MCG
2 SPRAY, SUSPENSION (ML) NASAL DAILY
Qty: 16 G | Refills: 0 | Status: SHIPPED | OUTPATIENT
Start: 2023-09-20

## 2023-09-20 RX ORDER — HYDROCHLOROTHIAZIDE 12.5 MG/1
12.5 TABLET ORAL DAILY
Qty: 30 TABLET | Refills: 1 | Status: SHIPPED | OUTPATIENT
Start: 2023-09-20

## 2023-09-20 SDOH — ECONOMIC STABILITY: FOOD INSECURITY: WITHIN THE PAST 12 MONTHS, YOU WORRIED THAT YOUR FOOD WOULD RUN OUT BEFORE YOU GOT MONEY TO BUY MORE.: PATIENT DECLINED

## 2023-09-20 SDOH — ECONOMIC STABILITY: INCOME INSECURITY: HOW HARD IS IT FOR YOU TO PAY FOR THE VERY BASICS LIKE FOOD, HOUSING, MEDICAL CARE, AND HEATING?: PATIENT DECLINED

## 2023-09-20 SDOH — ECONOMIC STABILITY: FOOD INSECURITY: WITHIN THE PAST 12 MONTHS, THE FOOD YOU BOUGHT JUST DIDN'T LAST AND YOU DIDN'T HAVE MONEY TO GET MORE.: PATIENT DECLINED

## 2023-09-20 SDOH — ECONOMIC STABILITY: HOUSING INSECURITY
IN THE LAST 12 MONTHS, WAS THERE A TIME WHEN YOU DID NOT HAVE A STEADY PLACE TO SLEEP OR SLEPT IN A SHELTER (INCLUDING NOW)?: NO

## 2023-09-20 SDOH — ECONOMIC STABILITY: TRANSPORTATION INSECURITY
IN THE PAST 12 MONTHS, HAS LACK OF TRANSPORTATION KEPT YOU FROM MEETINGS, WORK, OR FROM GETTING THINGS NEEDED FOR DAILY LIVING?: NO

## 2023-09-20 NOTE — PROGRESS NOTES
Shahnaz Palacios is a 38 y.o. female who was seen by synchronous (real-time) audio-video technology on 9/20/2023. Assessment & Plan:   Below is the assessment and plan developed based on review of pertinent history, physical exam (if applicable), labs, studies, and medications. 1. Hypothyroidism, unspecified type  -     TSH + Free T4 Panel; Future  2. Essential (primary) hypertension  -     Comprehensive Metabolic Panel; Future  -     CBC with Auto Differential; Future  -     hydroCHLOROthiazide (HYDRODIURIL) 12.5 MG tablet; Take 1 tablet by mouth daily, Disp-30 tablet, R-1Normal  3. Sensation of fullness in right ear  4. History of COVID-19  5. Acute cough  Singulair and flonase  Labs ordered  Advised to take medications as prescribed  Follow-up with Dr. Monique Galaviz within 3 months      I spent at least 23 minutes on this visit with this established patient. ([id number])  Subjective:   Shahnaz Palacios was seen for Cough    Patient reports cough lingering since having Covid 2 weeks ago. She notes ear pressure with popping and fullness. No fever. Cough is worse at night. She is requesting refill of singulair and hctz. She is due for labs. She has not been taking synthroid as prescribed, but she felt it caused afternoon fatigue. She is still feeling some fatigue. Prior to Admission medications    Medication Sig Start Date End Date Taking?  Authorizing Provider   montelukast (SINGULAIR) 10 MG tablet Take 1 tablet by mouth nightly 9/20/23  Yes SARAH Grant NP   fluticasone [facility]) 50 MCG/ACT nasal spray 2 sprays by Each Nostril route daily 9/20/23  Yes SARAH Grant NP   hydroCHLOROthiazide (HYDRODIURIL) 12.5 MG tablet Take 1 tablet by mouth daily 9/20/23  Yes SARAH Grant NP   apixaban (ELIQUIS) 5 MG TABS tablet Take 1 tablet by mouth 2 times daily 7/6/22   Ar Automatic Reconciliation   levothyroxine (SYNTHROID) 50 MCG tablet Take 50 mcg by mouth every morning (before

## 2023-09-20 NOTE — PATIENT INSTRUCTIONS
Singulair and flonase  Labs ordered  Advised to take medications as prescribed  Follow-up with Dr. Colin Zapata within 3 months
New born admission.
trans. to [facility]

## 2023-09-21 LAB
ALBUMIN SERPL-MCNC: 4.4 G/DL (ref 3.9–4.9)
ALBUMIN/GLOB SERPL: 1.5 {RATIO} (ref 1.2–2.2)
ALP SERPL-CCNC: 88 IU/L (ref 44–121)
ALT SERPL-CCNC: 14 IU/L (ref 0–32)
AST SERPL-CCNC: 12 IU/L (ref 0–40)
BASOPHILS # BLD AUTO: 0.1 X10E3/UL (ref 0–0.2)
BASOPHILS NFR BLD AUTO: 1 %
BILIRUB SERPL-MCNC: 0.6 MG/DL (ref 0–1.2)
BUN SERPL-MCNC: 13 MG/DL (ref 6–24)
BUN/CREAT SERPL: 18 (ref 9–23)
CALCIUM SERPL-MCNC: 9.5 MG/DL (ref 8.7–10.2)
CHLORIDE SERPL-SCNC: 101 MMOL/L (ref 96–106)
CO2 SERPL-SCNC: 25 MMOL/L (ref 20–29)
CREAT SERPL-MCNC: 0.74 MG/DL (ref 0.57–1)
EGFRCR SERPLBLD CKD-EPI 2021: 105 ML/MIN/1.73
EOSINOPHIL # BLD AUTO: 0.1 X10E3/UL (ref 0–0.4)
EOSINOPHIL NFR BLD AUTO: 2 %
ERYTHROCYTE [DISTWIDTH] IN BLOOD BY AUTOMATED COUNT: 12.7 % (ref 11.7–15.4)
GLOBULIN SER CALC-MCNC: 2.9 G/DL (ref 1.5–4.5)
GLUCOSE SERPL-MCNC: 99 MG/DL (ref 70–99)
HCT VFR BLD AUTO: 40.4 % (ref 34–46.6)
HGB BLD-MCNC: 13.7 G/DL (ref 11.1–15.9)
IMM GRANULOCYTES # BLD AUTO: 0 X10E3/UL (ref 0–0.1)
IMM GRANULOCYTES NFR BLD AUTO: 0 %
LYMPHOCYTES # BLD AUTO: 1.3 X10E3/UL (ref 0.7–3.1)
LYMPHOCYTES NFR BLD AUTO: 21 %
MCH RBC QN AUTO: 29.5 PG (ref 26.6–33)
MCHC RBC AUTO-ENTMCNC: 33.9 G/DL (ref 31.5–35.7)
MCV RBC AUTO: 87 FL (ref 79–97)
MONOCYTES # BLD AUTO: 0.4 X10E3/UL (ref 0.1–0.9)
MONOCYTES NFR BLD AUTO: 6 %
NEUTROPHILS # BLD AUTO: 4.3 X10E3/UL (ref 1.4–7)
NEUTROPHILS NFR BLD AUTO: 70 %
PLATELET # BLD AUTO: 282 X10E3/UL (ref 150–450)
POTASSIUM SERPL-SCNC: 4 MMOL/L (ref 3.5–5.2)
PROT SERPL-MCNC: 7.3 G/DL (ref 6–8.5)
RBC # BLD AUTO: 4.64 X10E6/UL (ref 3.77–5.28)
SODIUM SERPL-SCNC: 140 MMOL/L (ref 134–144)
T4 FREE SERPL-MCNC: 0.93 NG/DL (ref 0.82–1.77)
TSH SERPL DL<=0.005 MIU/L-ACNC: 10.8 UIU/ML (ref 0.45–4.5)
WBC # BLD AUTO: 6.2 X10E3/UL (ref 3.4–10.8)

## 2023-09-26 ENCOUNTER — TELEMEDICINE (OUTPATIENT)
Age: 43
End: 2023-09-26
Payer: MEDICAID

## 2023-09-26 DIAGNOSIS — E03.9 HYPOTHYROIDISM, UNSPECIFIED TYPE: Primary | ICD-10-CM

## 2023-09-26 PROCEDURE — 99213 OFFICE O/P EST LOW 20 MIN: CPT | Performed by: INTERNAL MEDICINE

## 2023-09-26 RX ORDER — LEVOTHYROXINE SODIUM 0.05 MG/1
50 TABLET ORAL
Qty: 90 TABLET | Refills: 0 | Status: SHIPPED | OUTPATIENT
Start: 2023-09-26

## 2023-09-26 ASSESSMENT — PATIENT HEALTH QUESTIONNAIRE - PHQ9
SUM OF ALL RESPONSES TO PHQ QUESTIONS 1-9: 0
2. FEELING DOWN, DEPRESSED OR HOPELESS: 0
SUM OF ALL RESPONSES TO PHQ QUESTIONS 1-9: 0
1. LITTLE INTEREST OR PLEASURE IN DOING THINGS: 0
SUM OF ALL RESPONSES TO PHQ QUESTIONS 1-9: 0
SUM OF ALL RESPONSES TO PHQ QUESTIONS 1-9: 0
SUM OF ALL RESPONSES TO PHQ9 QUESTIONS 1 & 2: 0

## 2023-09-26 NOTE — PROGRESS NOTES
Chief Complaint   Patient presents with    Follow-up    Hypertension     There were no vitals taken for this visit.

## 2023-09-26 NOTE — PROGRESS NOTES
Reza Poe is a 38 y.o. female who was seen by synchronous (real-time) audio-video technology on 9/26/2023. She confirmed that, for purposes of billing, this is a virtual visit with her provider for which we will submit a claim for reimbursement to her insurance company. She is aware that she will be responsible for any copays, coinsurance amounts or other amounts not covered by her insurance company. Do you accept - YES    This visit was completed was completed virtually using [employer]      Subjective:   Reza Poe was seen for follow up of her hypothyroid. -has not been taking her levothyroxine. States that it makes her have energy for a few hours, then she is tired. Want another medication. She also notes that since she stopped use of this medication ,she is still tired after a few hours after she wakes up. She drinks 2 cups of coffee in the morning. Breakfast is heavy on carbohydrates. She has cut down on sugar. Feeling sluggish most of her day. Prior to Admission medications    Medication Sig Start Date End Date Taking?  Authorizing Provider   montelukast (SINGULAIR) 10 MG tablet Take 1 tablet by mouth nightly 9/20/23  Yes SARAH Roblero NP   fluticasone [facility]) 50 MCG/ACT nasal spray 2 sprays by Each Nostril route daily 9/20/23  Yes SARAH Roblero NP   hydroCHLOROthiazide (HYDRODIURIL) 12.5 MG tablet Take 1 tablet by mouth daily 9/20/23  Yes SARAH Roblero NP   apixaban (ELIQUIS) 5 MG TABS tablet Take 1 tablet by mouth 2 times daily 7/6/22  Yes Ar Automatic Reconciliation   norethindrone (MICRONOR) 0.35 MG tablet Dr. Antonia Moritz, started 7/2018 9/13/18  Yes Ar Automatic Reconciliation   levothyroxine (SYNTHROID) 50 MCG tablet Take 50 mcg by mouth every morning (before breakfast)  Patient not taking: Reported on 6/8/2023 12/15/22   Ar Automatic Reconciliation       Allergies   Allergen Reactions    Aspirin Other (See Comments)     Heart palpitations

## 2023-10-01 RX ORDER — APIXABAN 5 MG/1
5 TABLET, FILM COATED ORAL 2 TIMES DAILY
Qty: 180 TABLET | Refills: 0 | Status: SHIPPED | OUTPATIENT
Start: 2023-10-01

## 2023-11-02 ENCOUNTER — TELEMEDICINE (OUTPATIENT)
Age: 43
End: 2023-11-02
Payer: MEDICAID

## 2023-11-02 DIAGNOSIS — M79.89 LEFT LEG SWELLING: ICD-10-CM

## 2023-11-02 DIAGNOSIS — M79.662 PAIN OF LEFT CALF: Primary | ICD-10-CM

## 2023-11-02 PROCEDURE — 99213 OFFICE O/P EST LOW 20 MIN: CPT | Performed by: NURSE PRACTITIONER

## 2023-11-02 NOTE — PROGRESS NOTES
Yara Atkinson is a 38 y.o. female who was seen by synchronous (real-time) audio-video technology on 11/2/2023. Assessment & Plan:   Below is the assessment and plan developed based on review of pertinent history, physical exam (if applicable), labs, studies, and medications. 1. Pain of left calf  -     Vascular duplex lower extremity venous left; Future  2. Left leg swelling  -     Vascular duplex lower extremity venous left; Future     Doppler ordered, plan will depend on result  I spent at least 23 minutes on this visit with this established patient. ([id number])  Subjective:   Yara Atkinson was seen for Leg Swelling    Patient reports a nightstand fell on her left leg last week. She notes pain and swelling in left calf since this happened. There is redness of left posterior knee. Patient is not taking eliquis as prescribed. She reports taking only once daily. She has history of left leg DVT and PE. No shortness of breath. Prior to Admission medications    Medication Sig Start Date End Date Taking?  Authorizing Provider   ELIQUIS 5 MG TABS tablet Take 1 tablet by mouth twice daily 10/1/23   David Nunez MD   levothyroxine (SYNTHROID) 50 MCG tablet Take 1 tablet by mouth every morning (before breakfast) 9/26/23   David Nunez MD   montelukast (SINGULAIR) 10 MG tablet Take 1 tablet by mouth nightly 9/20/23   SARAH Moreno NP   fluticasone [facility]) 50 MCG/ACT nasal spray 2 sprays by Each Nostril route daily 9/20/23   SARAH Moreno NP   hydroCHLOROthiazide (HYDRODIURIL) 12.5 MG tablet Take 1 tablet by mouth daily 9/20/23   SARAH Moreno NP   norethindrone [facility]) 0.35 MG tablet Dr. Burgess Balderas, started 7/2018 9/13/18   Automatic Reconciliation, Ar       Patient Active Problem List   Diagnosis    History of pulmonary embolism    Venous insufficiency of left leg    History of DVT of lower extremity    Essential hypertension    Splenic artery aneurysm [facility])     Patient Active

## 2024-03-01 DIAGNOSIS — I10 ESSENTIAL (PRIMARY) HYPERTENSION: ICD-10-CM

## 2024-03-03 RX ORDER — HYDROCHLOROTHIAZIDE 12.5 MG/1
12.5 TABLET ORAL DAILY
Qty: 30 TABLET | Refills: 0 | Status: SHIPPED | OUTPATIENT
Start: 2024-03-03

## 2024-03-05 RX ORDER — APIXABAN 5 MG/1
5 TABLET, FILM COATED ORAL 2 TIMES DAILY
Qty: 180 TABLET | Refills: 0 | Status: SHIPPED | OUTPATIENT
Start: 2024-03-05

## 2024-03-05 NOTE — TELEPHONE ENCOUNTER
Pt last seen on 11/0223. VV, 09/26/23 VV  Has appt on 05-        Rx sent to pharmacy as previously filled and verified by Verbal Order Read Back with provider.

## 2024-04-15 ENCOUNTER — TELEMEDICINE (OUTPATIENT)
Age: 44
End: 2024-04-15
Payer: MEDICAID

## 2024-04-15 DIAGNOSIS — H10.13 ALLERGIC CONJUNCTIVITIS OF BOTH EYES: ICD-10-CM

## 2024-04-15 DIAGNOSIS — J30.9 ALLERGIC RHINITIS, UNSPECIFIED SEASONALITY, UNSPECIFIED TRIGGER: Primary | ICD-10-CM

## 2024-04-15 PROCEDURE — 99213 OFFICE O/P EST LOW 20 MIN: CPT | Performed by: NURSE PRACTITIONER

## 2024-04-15 RX ORDER — OLOPATADINE HYDROCHLORIDE 1 MG/ML
1 SOLUTION/ DROPS OPHTHALMIC 2 TIMES DAILY
Qty: 5 ML | Refills: 1 | Status: SHIPPED | OUTPATIENT
Start: 2024-04-15 | End: 2024-05-15

## 2024-04-15 RX ORDER — CETIRIZINE HYDROCHLORIDE 10 MG/1
10 TABLET ORAL DAILY
Qty: 30 TABLET | Refills: 2 | Status: SHIPPED | OUTPATIENT
Start: 2024-04-15

## 2024-04-15 NOTE — PROGRESS NOTES
Past Surgical History:   Procedure Laterality Date     SECTION           SECTION          GYN      Vaginal x4 (yrs: , , 2006, 2007, 2008, , )    OTHER SURGICAL HISTORY      wisdom teeth removed at age 20.       Review of Systems   - per HPI    Objective:   No data recorded   General: alert, cooperative, and no distress   Mental  status: normal mood, behavior, speech, dress, motor activity, and thought processes, able to follow commands   Eyes: EOM intact, normal sclera   Mouth: mucous membranes moist   Neck: no visualized mass   Resp: normal effort and no respiratory distress   Neuro: no gross deficits   Musculoskeletal: normal ROM of neck   Skin: no discoloration or lesions of concern on visible areas   Psychiatric: normal affect, no hallucinations     Charis Moreau, was evaluated through a synchronous (real-time) audio-video encounter. The patient (or guardian if applicable) is aware that this is a billable service, which includes applicable co-pays. This Virtual Visit was conducted with patient's (and/or legal guardian's) consent. Patient identification was verified, and a caregiver was present when appropriate.   The patient was located at Home: [address] [place] [address] Dr Solis [address] [place] [id number]  Provider was located at Home [place]Appt [place] State[place]  Confirm you are appropriately licensed, registered, or certified to deliver care in the state where the patient is located as indicated above. If you are not or unsure, please re-schedule the visit: Yes, I confirm.         SARAH Higginbotham NP

## 2024-10-11 ENCOUNTER — APPOINTMENT (OUTPATIENT)
Facility: HOSPITAL | Age: 44
End: 2024-10-11
Payer: MEDICAID

## 2024-10-11 ENCOUNTER — HOSPITAL ENCOUNTER (EMERGENCY)
Facility: HOSPITAL | Age: 44
Discharge: HOME | End: 2024-10-11
Attending: EMERGENCY MEDICINE
Payer: MEDICAID

## 2024-10-11 VITALS
RESPIRATION RATE: 16 BRPM | OXYGEN SATURATION: 100 % | TEMPERATURE: 98.4 F | HEIGHT: 64 IN | SYSTOLIC BLOOD PRESSURE: 138 MMHG | DIASTOLIC BLOOD PRESSURE: 101 MMHG | BODY MASS INDEX: 39.14 KG/M2 | HEART RATE: 85 BPM | WEIGHT: 229.28 LBS

## 2024-10-11 DIAGNOSIS — L03.116 LEFT LEG CELLULITIS: ICD-10-CM

## 2024-10-11 DIAGNOSIS — M79.605 LEFT LEG PAIN: Primary | ICD-10-CM

## 2024-10-11 LAB
ANION GAP SERPL CALC-SCNC: 2 MMOL/L (ref 2–12)
BASOPHILS # BLD: 0.1 K/UL (ref 0–0.1)
BASOPHILS NFR BLD: 1 % (ref 0–1)
BUN SERPL-MCNC: 15 MG/DL (ref 6–20)
BUN/CREAT SERPL: 17 (ref 12–20)
CALCIUM SERPL-MCNC: 9.7 MG/DL (ref 8.5–10.1)
CHLORIDE SERPL-SCNC: 109 MMOL/L (ref 97–108)
CO2 SERPL-SCNC: 29 MMOL/L (ref 21–32)
COMMENT:: NORMAL
CREAT SERPL-MCNC: 0.9 MG/DL (ref 0.55–1.02)
DIFFERENTIAL METHOD BLD: NORMAL
ECHO BSA: 2.17 M2
EOSINOPHIL # BLD: 0.1 K/UL (ref 0–0.4)
EOSINOPHIL NFR BLD: 2 % (ref 0–7)
ERYTHROCYTE [DISTWIDTH] IN BLOOD BY AUTOMATED COUNT: 12.3 % (ref 11.5–14.5)
GLUCOSE SERPL-MCNC: 85 MG/DL (ref 65–100)
HCT VFR BLD AUTO: 42.2 % (ref 35–47)
HGB BLD-MCNC: 14.3 G/DL (ref 11.5–16)
IMM GRANULOCYTES # BLD AUTO: 0 K/UL (ref 0–0.04)
IMM GRANULOCYTES NFR BLD AUTO: 0 % (ref 0–0.5)
LYMPHOCYTES # BLD: 1.4 K/UL (ref 0.8–3.5)
LYMPHOCYTES NFR BLD: 20 % (ref 12–49)
MCH RBC QN AUTO: 28.8 PG (ref 26–34)
MCHC RBC AUTO-ENTMCNC: 33.9 G/DL (ref 30–36.5)
MCV RBC AUTO: 84.9 FL (ref 80–99)
MONOCYTES # BLD: 0.4 K/UL (ref 0–1)
MONOCYTES NFR BLD: 5 % (ref 5–13)
NEUTS SEG # BLD: 5 K/UL (ref 1.8–8)
NEUTS SEG NFR BLD: 72 % (ref 32–75)
NRBC # BLD: 0 K/UL (ref 0–0.01)
NRBC BLD-RTO: 0 PER 100 WBC
PLATELET # BLD AUTO: 272 K/UL (ref 150–400)
PMV BLD AUTO: 9.7 FL (ref 8.9–12.9)
POTASSIUM SERPL-SCNC: 3.8 MMOL/L (ref 3.5–5.1)
RBC # BLD AUTO: 4.97 M/UL (ref 3.8–5.2)
SODIUM SERPL-SCNC: 140 MMOL/L (ref 136–145)
SPECIMEN HOLD: NORMAL
WBC # BLD AUTO: 7 K/UL (ref 3.6–11)

## 2024-10-11 PROCEDURE — 80048 BASIC METABOLIC PNL TOTAL CA: CPT

## 2024-10-11 PROCEDURE — 73562 X-RAY EXAM OF KNEE 3: CPT

## 2024-10-11 PROCEDURE — 36415 COLL VENOUS BLD VENIPUNCTURE: CPT

## 2024-10-11 PROCEDURE — 93971 EXTREMITY STUDY: CPT

## 2024-10-11 PROCEDURE — 99284 EMERGENCY DEPT VISIT MOD MDM: CPT

## 2024-10-11 PROCEDURE — 85025 COMPLETE CBC W/AUTO DIFF WBC: CPT

## 2024-10-11 RX ORDER — CEPHALEXIN 500 MG/1
500 CAPSULE ORAL 4 TIMES DAILY
Qty: 28 CAPSULE | Refills: 0 | Status: SHIPPED | OUTPATIENT
Start: 2024-10-11 | End: 2024-10-18

## 2024-10-11 ASSESSMENT — PAIN DESCRIPTION - FREQUENCY: FREQUENCY: CONTINUOUS

## 2024-10-11 ASSESSMENT — PAIN DESCRIPTION - PAIN TYPE: TYPE: ACUTE PAIN

## 2024-10-11 ASSESSMENT — PAIN - FUNCTIONAL ASSESSMENT
PAIN_FUNCTIONAL_ASSESSMENT: INTOLERABLE, UNABLE TO DO ANY ACTIVE OR PASSIVE ACTIVITIES
PAIN_FUNCTIONAL_ASSESSMENT: 0-10

## 2024-10-11 ASSESSMENT — ENCOUNTER SYMPTOMS
VOMITING: 0
DIARRHEA: 0
COUGH: 0

## 2024-10-11 ASSESSMENT — PAIN SCALES - GENERAL: PAINLEVEL_OUTOF10: 10

## 2024-10-11 ASSESSMENT — PAIN DESCRIPTION - LOCATION: LOCATION: LEG

## 2024-10-11 ASSESSMENT — PAIN DESCRIPTION - ONSET: ONSET: ON-GOING

## 2024-10-11 ASSESSMENT — PAIN DESCRIPTION - ORIENTATION: ORIENTATION: LEFT

## 2024-10-11 ASSESSMENT — PAIN DESCRIPTION - DESCRIPTORS: DESCRIPTORS: ACHING;SHARP
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
## 2024-10-12 NOTE — ED NOTES
I have reviewed discharge instructions & discussed discharge medications with the patient. Opportunity for questions & clarifications was provided. All questions & concerns were addressed. The patient verbalized understanding. She left via wheelchair w/ sons in stable condition, no acute distress noted.

## 2024-10-12 NOTE — ED NOTES
ED SIGN OUT NOTE  Care assumed at [facility] 8:57 PM EDT    Patient was signed out to me by Lesley Romero PA-C    Patient is awaiting duplex US.    BP (!) 164/78   Pulse (!) 101   Temp 98.2 °F (36.8 °C) (Oral)   Resp 18   Ht 1.626 m (5' 4\")   Wt 104 kg (229 lb 4.5 oz)   SpO2 96%   BMI 39.36 kg/m²     Labs Reviewed   BASIC METABOLIC PANEL - Abnormal; Notable for the following components:       Result Value    Chloride 109 (*)     All other components within normal limits   CBC WITH AUTO DIFFERENTIAL   EXTRA TUBES HOLD     XR KNEE LEFT (3 VIEWS)   Final Result   No acute abnormality.      Electronically signed by Clayton Leon      Vascular duplex lower extremity venous left    (Results Pending)            Diagnosis:   1. Left leg pain    2. Left leg cellulitis        Disposition:        Plan:   Plan to d/c if duplex is negative for acute DVT.  Lesley will send abx for presumed LE cellulitis    Duplex neg.  Discussed results with patient.    MISBAH Schwartz Lindsay H, PA  10/11/24 [id number]

## 2024-10-13 ENCOUNTER — OFFICE VISIT (OUTPATIENT)
Age: 44
End: 2024-10-13

## 2024-10-13 VITALS
OXYGEN SATURATION: 98 % | BODY MASS INDEX: 38.15 KG/M2 | HEIGHT: 65 IN | WEIGHT: 229 LBS | SYSTOLIC BLOOD PRESSURE: 137 MMHG | HEART RATE: 83 BPM | DIASTOLIC BLOOD PRESSURE: 87 MMHG | RESPIRATION RATE: 18 BRPM | TEMPERATURE: 98.4 F

## 2024-10-13 DIAGNOSIS — R03.0 ELEVATED BLOOD PRESSURE READING: ICD-10-CM

## 2024-10-13 DIAGNOSIS — L03.116 CELLULITIS OF LEFT LOWER EXTREMITY: Primary | ICD-10-CM

## 2024-10-13 NOTE — PATIENT INSTRUCTIONS
Thank you for visiting [facility] today.    Take oral antibiotics on a full stomach until you complete the entire prescription.   Take a probiotic while you are using the antibiotic.    Elevate the area - elevating the arm or leg above the level of the heart can help to reduce swelling and speed healing.  Keep the area clean and dry - it is important to keep the infected area clean and dry.   You can shower or bathe normally and pat the area dry with a clean towel.  You can use a bandage or gauze to protect the skin if needed.  You may use warm, moist compresses for pain relief.  Antibiotics - Most people with cellulitis are treated with an antibiotic that is taken by mouth for 5 to 14 days.   It is important to take the antibiotic exactly as recommended and to finish the entire course of treatment.  Skipping doses or ending treatment early could potentially allow the bacteria to become resistant and require longer treatment or permit the infection to worse after initial improvement.  Time to heal - Resolution of fever and chills, if they were initially present, should occur within one to two days after starting antibiotic therapy.  Local findings of swelling, warmth, and redness should begin to improve within one to three days after starting antibiotics, although these symptoms can persist for two weeks.  If the reddened area becomes larger, more swollen, or more tender, call your healthcare provider.  He or she may want to re-examine you to determine if further testing or an alternate antibiotic is needed.      Please follow up with your PCP within 2 days if your signs and symptoms have not resolved or worsened.    Please go immediately to the ED if you develop fever of 100.4F or above, chills, vomiting, worsening redness/pain/swelling to affected area, red streaks, and/or no improvement after 48 hours of oral antibiotic therapy.

## 2024-10-13 NOTE — PROGRESS NOTES
Subjective     Chief Complaint   Patient presents with    Other     After being prescribed a med while at the ED, after taking the med she had an allergic reaction, hands and feet went numb and felt like she was going to pass out. Med was Cephalexin.        Patient is 43 year old female presenting with possible medication reaction.  She was seen in the ER for left lower leg swelling, erythema and pain.  She was negative for DVT.  She has past history of same and is on Eliquis.  She was diagnosed with cellulitis and placed on keflex.  She took her first dose today and reported numbness in both hands and feet.  She also felt as if she were going to pass out.  Symptoms lasted a couple of minutes and then subsided.  Denies any rash or shortness of breath.          Past Medical History:   Diagnosis Date    Deep vein blood clot of left lower extremity (HCC)     second     Hypertension complicating pregnancy 2011    Hypothyroidism 10/14/2024    Ill-defined condition     cellulitis in bilateral legs    Phlebitis and thrombophlebitis of unspecified site     bilateral ankles    Pregnancy     7th child now,     Thromboembolus (HCC)        Past Surgical History:   Procedure Laterality Date     SECTION           SECTION          GYN      Vaginal x4 (yrs: 2004, , 2006, 2007, 2008, , )    OTHER SURGICAL HISTORY      wisdom teeth removed at age 20.       Family History   Problem Relation Age of Onset    Heart Disease Father     Hypertension Father     Cancer Maternal Grandmother         breast    Breast Cancer Maternal Grandmother     Alzheimer's Disease Mother     Rheum Arthritis Mother     Diabetes Mother     Mental Retardation Other        Allergies   Allergen Reactions    Aspirin Other (See Comments)     Heart palpitations    Azithromycin Rash    Ciprofloxacin Rash    Codeine Nausea And Vomiting    Triamcinolone Acetonide Anaphylaxis       Social History

## 2024-10-14 PROBLEM — N93.9 VAGINAL BLEEDING: Status: ACTIVE | Noted: 2022-07-14

## 2024-10-14 PROBLEM — H92.02 OTALGIA OF LEFT EAR: Status: ACTIVE | Noted: 2024-10-14

## 2024-10-14 PROBLEM — I26.99 PULMONARY EMBOLISM (HCC): Status: ACTIVE | Noted: 2021-07-21

## 2024-10-14 PROBLEM — E03.9 HYPOTHYROIDISM: Status: ACTIVE | Noted: 2024-10-14

## 2024-10-14 PROBLEM — I82.90 VTE (VENOUS THROMBOEMBOLISM): Status: ACTIVE | Noted: 2021-07-21

## 2024-10-14 PROBLEM — J32.9 SINUSITIS: Status: ACTIVE | Noted: 2024-10-14

## 2024-10-14 PROBLEM — S80.12XA CONTUSION OF LEG, LEFT: Status: ACTIVE | Noted: 2024-10-08

## 2024-10-14 PROBLEM — E87.6 HYPOKALEMIA: Status: ACTIVE | Noted: 2021-12-04

## 2024-10-14 PROBLEM — U07.1 COVID-19: Status: ACTIVE | Noted: 2021-12-04

## 2024-10-14 PROBLEM — M79.662 PAIN OF LEFT LOWER LEG: Status: ACTIVE | Noted: 2024-07-01

## 2024-10-14 ASSESSMENT — ENCOUNTER SYMPTOMS: COLOR CHANGE: 1

## 2024-10-15 LAB — ECHO BSA: 2.17 M2

## 2024-10-18 ENCOUNTER — TELEPHONE (OUTPATIENT)
Age: 44
End: 2024-10-18

## 2024-10-18 NOTE — TELEPHONE ENCOUNTER
Spoke to pt and informed her we was going to see if we could get her in sooner than 1/23/25 for a new pt appt with RUMA Lamb due to she has bad cellulitis. But pt actually scheduled a sooner visit over at [facility] 11/19/24 at 9 am with Luis Monzon to establish care. Inform pt will we go ahead and cancel the 1/23/25 visit with RUMA Hogan. Pt said okay and thanks for calling to double check.-TM 10/18/24

## 2024-11-13 PROBLEM — J32.9 SINUSITIS: Status: RESOLVED | Noted: 2024-10-14 | Resolved: 2024-11-13

## 2025-04-25 ENCOUNTER — HOSPITAL ENCOUNTER (EMERGENCY)
Facility: HOSPITAL | Age: 45
Discharge: HOME | End: 2025-04-25
Attending: EMERGENCY MEDICINE
Payer: MEDICAID

## 2025-04-25 ENCOUNTER — APPOINTMENT (OUTPATIENT)
Facility: HOSPITAL | Age: 45
End: 2025-04-25
Payer: MEDICAID

## 2025-04-25 VITALS
HEIGHT: 65 IN | TEMPERATURE: 98.5 F | WEIGHT: 231.7 LBS | RESPIRATION RATE: 17 BRPM | DIASTOLIC BLOOD PRESSURE: 88 MMHG | OXYGEN SATURATION: 100 % | HEART RATE: 90 BPM | SYSTOLIC BLOOD PRESSURE: 162 MMHG | BODY MASS INDEX: 38.6 KG/M2

## 2025-04-25 DIAGNOSIS — I10 ESSENTIAL (PRIMARY) HYPERTENSION: ICD-10-CM

## 2025-04-25 DIAGNOSIS — S81.802A WOUND OF LEFT LOWER EXTREMITY, INITIAL ENCOUNTER: Primary | ICD-10-CM

## 2025-04-25 LAB
ALBUMIN SERPL-MCNC: 3.7 G/DL (ref 3.5–5)
ALBUMIN/GLOB SERPL: 0.9 (ref 1.1–2.2)
ALP SERPL-CCNC: 104 U/L (ref 45–117)
ALT SERPL-CCNC: 30 U/L (ref 12–78)
ANION GAP SERPL CALC-SCNC: 5 MMOL/L (ref 2–12)
APPEARANCE UR: CLEAR
AST SERPL-CCNC: 24 U/L (ref 15–37)
BACTERIA URNS QL MICRO: NEGATIVE /HPF
BASOPHILS # BLD: 0.04 K/UL (ref 0–0.1)
BASOPHILS NFR BLD: 0.6 % (ref 0–1)
BILIRUB SERPL-MCNC: 0.4 MG/DL (ref 0.2–1)
BILIRUB UR QL: NEGATIVE
BUN SERPL-MCNC: 15 MG/DL (ref 6–20)
BUN/CREAT SERPL: 17 (ref 12–20)
CALCIUM SERPL-MCNC: 9.4 MG/DL (ref 8.5–10.1)
CHLORIDE SERPL-SCNC: 107 MMOL/L (ref 97–108)
CO2 SERPL-SCNC: 28 MMOL/L (ref 21–32)
COLOR UR: ABNORMAL
COMMENT:: NORMAL
CREAT SERPL-MCNC: 0.88 MG/DL (ref 0.55–1.02)
DIFFERENTIAL METHOD BLD: NORMAL
ECHO BSA: 2.2 M2
EOSINOPHIL # BLD: 0.1 K/UL (ref 0–0.4)
EOSINOPHIL NFR BLD: 1.5 % (ref 0–7)
EPITH CASTS URNS QL MICRO: ABNORMAL /LPF
ERYTHROCYTE [DISTWIDTH] IN BLOOD BY AUTOMATED COUNT: 12.4 % (ref 11.5–14.5)
EST. AVERAGE GLUCOSE BLD GHB EST-MCNC: 100 MG/DL
GLOBULIN SER CALC-MCNC: 4.1 G/DL (ref 2–4)
GLUCOSE SERPL-MCNC: 87 MG/DL (ref 65–100)
GLUCOSE UR STRIP.AUTO-MCNC: NEGATIVE MG/DL
HBA1C MFR BLD: 5.1 % (ref 4–5.6)
HCG UR QL: NEGATIVE
HCT VFR BLD AUTO: 41.2 % (ref 35–47)
HGB BLD-MCNC: 13.3 G/DL (ref 11.5–16)
HGB UR QL STRIP: ABNORMAL
IMM GRANULOCYTES # BLD AUTO: 0.01 K/UL (ref 0–0.04)
IMM GRANULOCYTES NFR BLD AUTO: 0.1 % (ref 0–0.5)
KETONES UR QL STRIP.AUTO: NEGATIVE MG/DL
LEUKOCYTE ESTERASE UR QL STRIP.AUTO: NEGATIVE
LYMPHOCYTES # BLD: 1.29 K/UL (ref 0.8–3.5)
LYMPHOCYTES NFR BLD: 19.1 % (ref 12–49)
MAGNESIUM SERPL-MCNC: 1.9 MG/DL (ref 1.6–2.4)
MCH RBC QN AUTO: 28.6 PG (ref 26–34)
MCHC RBC AUTO-ENTMCNC: 32.3 G/DL (ref 30–36.5)
MCV RBC AUTO: 88.6 FL (ref 80–99)
MONOCYTES # BLD: 0.45 K/UL (ref 0–1)
MONOCYTES NFR BLD: 6.7 % (ref 5–13)
NEUTS SEG # BLD: 4.87 K/UL (ref 1.8–8)
NEUTS SEG NFR BLD: 72 % (ref 32–75)
NITRITE UR QL STRIP.AUTO: NEGATIVE
NRBC # BLD: 0 K/UL (ref 0–0.01)
NRBC BLD-RTO: 0 PER 100 WBC
NT PRO BNP: 76 PG/ML
PH UR STRIP: 6.5 (ref 5–8)
PLATELET # BLD AUTO: 281 K/UL (ref 150–400)
PMV BLD AUTO: 9.8 FL (ref 8.9–12.9)
POTASSIUM SERPL-SCNC: 3.7 MMOL/L (ref 3.5–5.1)
PROCALCITONIN SERPL-MCNC: <0.05 NG/ML
PROT SERPL-MCNC: 7.8 G/DL (ref 6.4–8.2)
PROT UR STRIP-MCNC: NEGATIVE MG/DL
RBC # BLD AUTO: 4.65 M/UL (ref 3.8–5.2)
RBC #/AREA URNS HPF: ABNORMAL /HPF (ref 0–5)
SODIUM SERPL-SCNC: 140 MMOL/L (ref 136–145)
SP GR UR REFRACTOMETRY: 1.01 (ref 1–1.03)
SPECIMEN HOLD: NORMAL
SPECIMEN HOLD: NORMAL
TSH SERPL DL<=0.05 MIU/L-ACNC: 4.81 UIU/ML (ref 0.36–3.74)
UROBILINOGEN UR QL STRIP.AUTO: 1 EU/DL (ref 0.2–1)
WBC # BLD AUTO: 6.8 K/UL (ref 3.6–11)
WBC URNS QL MICRO: ABNORMAL /HPF (ref 0–4)

## 2025-04-25 PROCEDURE — 83880 ASSAY OF NATRIURETIC PEPTIDE: CPT

## 2025-04-25 PROCEDURE — 80053 COMPREHEN METABOLIC PANEL: CPT

## 2025-04-25 PROCEDURE — 84443 ASSAY THYROID STIM HORMONE: CPT

## 2025-04-25 PROCEDURE — 99284 EMERGENCY DEPT VISIT MOD MDM: CPT

## 2025-04-25 PROCEDURE — 83735 ASSAY OF MAGNESIUM: CPT

## 2025-04-25 PROCEDURE — 84145 PROCALCITONIN (PCT): CPT

## 2025-04-25 PROCEDURE — 85025 COMPLETE CBC W/AUTO DIFF WBC: CPT

## 2025-04-25 PROCEDURE — 83036 HEMOGLOBIN GLYCOSYLATED A1C: CPT

## 2025-04-25 PROCEDURE — 81025 URINE PREGNANCY TEST: CPT

## 2025-04-25 PROCEDURE — 93971 EXTREMITY STUDY: CPT

## 2025-04-25 PROCEDURE — 81001 URINALYSIS AUTO W/SCOPE: CPT

## 2025-04-25 RX ORDER — HYDROCHLOROTHIAZIDE 12.5 MG/1
12.5 TABLET ORAL DAILY
Qty: 30 TABLET | Refills: 0 | Status: SHIPPED | OUTPATIENT
Start: 2025-04-25

## 2025-04-25 ASSESSMENT — LIFESTYLE VARIABLES
HOW OFTEN DO YOU HAVE A DRINK CONTAINING ALCOHOL: NEVER
HOW MANY STANDARD DRINKS CONTAINING ALCOHOL DO YOU HAVE ON A TYPICAL DAY: PATIENT DOES NOT DRINK

## 2025-04-25 ASSESSMENT — PAIN DESCRIPTION - LOCATION: LOCATION: LEG

## 2025-04-25 ASSESSMENT — PAIN SCALES - GENERAL: PAINLEVEL_OUTOF10: 9

## 2025-04-25 ASSESSMENT — PAIN DESCRIPTION - ORIENTATION: ORIENTATION: ANTERIOR

## 2025-04-25 NOTE — ED NOTES
She was seen in February for swelling and scabbing for her L legg and started on augmentin and doxycyline. She did not take the doxycycline d/t history of allergy to doxycyline. She states the augmentin did not help. She states the scabbing and swelling of her leg has gotten worse. She states it burns and is painful. She takes HCTZ for leg swelling, but states this does not help. She also reports blurry vision in the morning and is worries she has diabetes. She has history of blood clots, but stopped taking her Eliquis 2 months ago. She reports chills, but is unsure if she has had fevers. Denies chest pain, SOB. Denies alcohol, tobacco, or recreational drug use.     6:39 PM  I have evaluated the patient as the Provider in Rapid Medical Evaluation (RME). I have reviewed her vital signs and the triage nurse assessment. I have talked with the patient and any available family and advised that I am the provider in triage and have ordered the appropriate study to initiate their work up based on the clinical presentation during my assessment. I have advised that the patient will be accommodated in the Main ED as soon as possible. I have also requested to contact the triage nurse or myself immediately if the patient experiences any changes in their condition during this brief waiting period.  MIRI García Abigail M, PA-C  04/25/25 2957

## 2025-04-25 NOTE — ED TRIAGE NOTES
TRIAGE NOTE:  Patient arrives from home with left leg pain and a non healing wound.  Reports pain 9/10.    Patient denies any chest pains or SOB. She was prescribed eliquis but has not been taking it.      She is on a diuretic as well but has not been taking it.      Patient reports she needs a new PCP, will provide resources.     She wants to be checked for diabetes was well.

## 2025-04-25 NOTE — DISCHARGE INSTRUCTIONS
You should take the Eliquis that you have and also take the hydrochlorothiazide I am prescribing you.  Please call the number provided follow-up in the [facility] care clinic        [facility] Departments     For adult and child immunizations, family planning, TB screening, STD testing and women's health services.   [facility]: [facility] [phone]      [facility] [phone]    [facility]   [phone]    [facility]   [phone]    [place]   [phone]    [facility]: [place] [phone]      [place] [phone]      [place] [phone]          General Health Clinics     For primary care services, woman and child wellness, and some clinics providing specialty care.   [facility] [address] [phone]   [facility] [address] [phone]   [facility] [address] [phone]   [facility] [address] [phone]   [facility] [address] [phone]   [facility] [address] [phone]   [facility] [address] [phone]   [facility] [address] [facility] [phone], [phone]     [place] [address] [phone]     [place] [address] [phone]   [facility] [address] [phone]   [facility]  [address] [phone]   [email or web address] ([email or web address]) [phone]         Sexual Health/Woman Wellness Clinics    For STD/HIV testing and treatment, pregnancy testing and services, men's health, birth control services, LGBT services, and hepatitis/HPV vaccine services.   [facility] [address] [phone]   [facility] [address]  [phone]   [facility] [address] [facility] [phone]   [facility] [address], 5th floor

## 2025-04-26 NOTE — ED PROVIDER NOTES
[facility] EMERGENCY DEPARTMENT  EMERGENCY DEPARTMENT ENCOUNTER      Pt Name: Charis Moreau  MRN: [id number]  Birthdate 1980  Date of evaluation: 2025  Provider: Efraín Hunter MD      HISTORY OF PRESENT ILLNESS      HPI        Nursing Notes were reviewed.    REVIEW OF SYSTEMS         Review of Systems        PAST MEDICAL HISTORY     Past Medical History:   Diagnosis Date    Deep vein blood clot of left lower extremity (HCC) 2008    second     Hypertension complicating pregnancy 2011    Hypothyroidism 10/14/2024    Ill-defined condition     cellulitis in bilateral legs    Phlebitis and thrombophlebitis of unspecified site     bilateral ankles    Pregnancy     7th child now,     Thromboembolus (HCC)          SURGICAL HISTORY       Past Surgical History:   Procedure Laterality Date     SECTION           SECTION          GYN      Vaginal x4 (yrs: , , , , , , )    OTHER SURGICAL HISTORY      wisdom teeth removed at age 20.         CURRENT MEDICATIONS       Current Discharge Medication List        CONTINUE these medications which have NOT CHANGED    Details   cetirizine (ZYRTEC) 10 MG tablet Take 1 tablet by mouth daily  Qty: 30 tablet, Refills: 2      ELIQUIS 5 MG TABS tablet Take 1 tablet by mouth twice daily  Qty: 180 tablet, Refills: 0      levothyroxine (SYNTHROID) 50 MCG tablet Take 1 tablet by mouth every morning (before breakfast)  Qty: 90 tablet, Refills: 0      montelukast (SINGULAIR) 10 MG tablet Take 1 tablet by mouth nightly  Qty: 30 tablet, Refills: 1      fluticasone (FLONASE) 50 MCG/ACT nasal spray 2 sprays by Each Nostril route daily  Qty: 16 g, Refills: 0      norethindrone (MICRONOR) 0.35 MG tablet Dr. Fontenot, started 2018             ALLERGIES     Aspirin, Azithromycin, Ciprofloxacin, Codeine, Triamcinolone acetonide, and Doxycycline    FAMILY HISTORY       Family History   Problem Relation Age of Onset

## 2025-04-30 ENCOUNTER — HOSPITAL ENCOUNTER (OUTPATIENT)
Facility: HOSPITAL | Age: 45
Discharge: HOME | End: 2025-04-30
Attending: INTERNAL MEDICINE
Payer: MEDICAID

## 2025-04-30 VITALS
DIASTOLIC BLOOD PRESSURE: 97 MMHG | HEART RATE: 89 BPM | TEMPERATURE: 98 F | RESPIRATION RATE: 16 BRPM | SYSTOLIC BLOOD PRESSURE: 155 MMHG

## 2025-04-30 DIAGNOSIS — L97.322 NON-PRESSURE CHRONIC ULCER OF LEFT ANKLE WITH FAT LAYER EXPOSED (HCC): ICD-10-CM

## 2025-04-30 DIAGNOSIS — I87.312 CHRONIC VENOUS HYPERTENSION (IDIOPATHIC) WITH ULCER OF LEFT LOWER EXTREMITY (CODE) (HCC): Primary | ICD-10-CM

## 2025-04-30 PROCEDURE — 11042 DBRDMT SUBQ TIS 1ST 20SQCM/<: CPT

## 2025-04-30 PROCEDURE — 11045 DBRDMT SUBQ TISS EACH ADDL: CPT

## 2025-04-30 PROCEDURE — 99214 OFFICE O/P EST MOD 30 MIN: CPT

## 2025-04-30 RX ORDER — LIDOCAINE 50 MG/G
OINTMENT TOPICAL PRN
Status: CANCELLED | OUTPATIENT
Start: 2025-04-30

## 2025-04-30 RX ORDER — LIDOCAINE HYDROCHLORIDE 40 MG/ML
SOLUTION TOPICAL PRN
OUTPATIENT
Start: 2025-04-30

## 2025-04-30 RX ORDER — LIDOCAINE 40 MG/G
CREAM TOPICAL PRN
Status: CANCELLED | OUTPATIENT
Start: 2025-04-30

## 2025-04-30 RX ORDER — CLOBETASOL PROPIONATE 0.5 MG/G
OINTMENT TOPICAL PRN
Status: CANCELLED | OUTPATIENT
Start: 2025-04-30

## 2025-04-30 RX ORDER — LIDOCAINE HYDROCHLORIDE 20 MG/ML
JELLY TOPICAL PRN
OUTPATIENT
Start: 2025-04-30

## 2025-04-30 RX ORDER — GENTAMICIN SULFATE 1 MG/G
OINTMENT TOPICAL PRN
Status: CANCELLED | OUTPATIENT
Start: 2025-04-30

## 2025-04-30 RX ORDER — SILVER SULFADIAZINE 10 MG/G
CREAM TOPICAL PRN
OUTPATIENT
Start: 2025-04-30

## 2025-04-30 RX ORDER — LIDOCAINE HYDROCHLORIDE 40 MG/ML
SOLUTION TOPICAL PRN
Status: CANCELLED | OUTPATIENT
Start: 2025-04-30

## 2025-04-30 RX ORDER — SODIUM CHLOR/HYPOCHLOROUS ACID 0.033 %
SOLUTION, IRRIGATION IRRIGATION PRN
OUTPATIENT
Start: 2025-04-30

## 2025-04-30 RX ORDER — SILVER SULFADIAZINE 10 MG/G
CREAM TOPICAL PRN
Status: CANCELLED | OUTPATIENT
Start: 2025-04-30

## 2025-04-30 RX ORDER — NEOMYCIN/BACITRACIN/POLYMYXINB 3.5-400-5K
OINTMENT (GRAM) TOPICAL PRN
Status: CANCELLED | OUTPATIENT
Start: 2025-04-30

## 2025-04-30 RX ORDER — BETAMETHASONE DIPROPIONATE 0.5 MG/G
CREAM TOPICAL PRN
Status: CANCELLED | OUTPATIENT
Start: 2025-04-30

## 2025-04-30 RX ORDER — BACITRACIN ZINC AND POLYMYXIN B SULFATE 500; 1000 [USP'U]/G; [USP'U]/G
OINTMENT TOPICAL PRN
Status: CANCELLED | OUTPATIENT
Start: 2025-04-30

## 2025-04-30 RX ORDER — SODIUM CHLOR/HYPOCHLOROUS ACID 0.033 %
SOLUTION, IRRIGATION IRRIGATION PRN
Status: CANCELLED | OUTPATIENT
Start: 2025-04-30

## 2025-04-30 RX ORDER — NEOMYCIN/BACITRACIN/POLYMYXINB 3.5-400-5K
OINTMENT (GRAM) TOPICAL PRN
OUTPATIENT
Start: 2025-04-30

## 2025-04-30 RX ORDER — LIDOCAINE 50 MG/G
OINTMENT TOPICAL PRN
OUTPATIENT
Start: 2025-04-30

## 2025-04-30 RX ORDER — BACITRACIN ZINC AND POLYMYXIN B SULFATE 500; 1000 [USP'U]/G; [USP'U]/G
OINTMENT TOPICAL PRN
OUTPATIENT
Start: 2025-04-30

## 2025-04-30 RX ORDER — LIDOCAINE 40 MG/G
CREAM TOPICAL PRN
OUTPATIENT
Start: 2025-04-30

## 2025-04-30 RX ORDER — GINSENG 100 MG
CAPSULE ORAL PRN
Status: CANCELLED | OUTPATIENT
Start: 2025-04-30

## 2025-04-30 RX ORDER — TRIAMCINOLONE ACETONIDE 1 MG/G
OINTMENT TOPICAL PRN
OUTPATIENT
Start: 2025-04-30

## 2025-04-30 RX ORDER — MUPIROCIN 20 MG/G
OINTMENT TOPICAL PRN
OUTPATIENT
Start: 2025-04-30

## 2025-04-30 RX ORDER — GENTAMICIN SULFATE 1 MG/G
OINTMENT TOPICAL PRN
OUTPATIENT
Start: 2025-04-30

## 2025-04-30 RX ORDER — LIDOCAINE HYDROCHLORIDE 20 MG/ML
JELLY TOPICAL PRN
Status: CANCELLED | OUTPATIENT
Start: 2025-04-30

## 2025-04-30 RX ORDER — TRIAMCINOLONE ACETONIDE 1 MG/G
OINTMENT TOPICAL PRN
Status: CANCELLED | OUTPATIENT
Start: 2025-04-30

## 2025-04-30 RX ORDER — CLOBETASOL PROPIONATE 0.5 MG/G
OINTMENT TOPICAL PRN
OUTPATIENT
Start: 2025-04-30

## 2025-04-30 RX ORDER — GINSENG 100 MG
CAPSULE ORAL PRN
OUTPATIENT
Start: 2025-04-30

## 2025-04-30 RX ORDER — MUPIROCIN 20 MG/G
OINTMENT TOPICAL PRN
Status: CANCELLED | OUTPATIENT
Start: 2025-04-30

## 2025-04-30 RX ORDER — BETAMETHASONE DIPROPIONATE 0.5 MG/G
CREAM TOPICAL PRN
OUTPATIENT
Start: 2025-04-30

## 2025-04-30 NOTE — FLOWSHEET NOTE
04/30/25 0959   Anesthetic   Anesthetic 4% Lidocaine Cream   Left Leg Edema Point of Measurement   Leg circumference 48 cm   Ankle circumference 29.5 cm   Peripheral Vascular   LLE Edema +2;Pitting   LLE Neurovascular Assessment   Capillary Refill Less than/Equal to 3 seconds   Color Appropriate for Ethnicity   Temperature Warm   LLE Sensation  Full sensation   L Pedal Pulse +1   Wound 04/30/25 Leg Left;Anterior;Lower   Date First Assessed/Time First Assessed: 04/30/25 0959   Present on Original Admission: Yes  Location: Leg  Wound Location Orientation: Left;Anterior;Lower   Wound Image    Dressing Status   (open to air)   Wound Cleansed Cleansed with saline   Offloading for Diabetic Foot Ulcers Offloading not ordered   Wound Length (cm) 3.8 cm   Wound Width (cm) 4 cm   Wound Depth (cm) 0.1 cm   Wound Surface Area (cm^2) 15.2 cm^2   Wound Volume (cm^3) 1.52 cm^3   Wound Assessment Dry;Devitalized tissue;Eschar dry;Slough;Pink/red   Drainage Amount Moderate (25-50%)   Drainage Description Serosanguinous   Odor Malodorous/putrid   Wound 04/30/25 Leg Left;Lower;Medial   Date First Assessed/Time First Assessed: 04/30/25 0959   Present on Original Admission: Yes  Location: Leg  Wound Location Orientation: Left;Lower;Medial   Wound Image    Dressing Status   (open to air)   Wound Cleansed Cleansed with saline   Offloading for Diabetic Foot Ulcers Offloading not required   Wound Length (cm) 2.5 cm   Wound Width (cm) 2.7 cm   Wound Depth (cm) 0.1 cm   Wound Surface Area (cm^2) 6.75 cm^2   Wound Volume (cm^3) 0.675 cm^3   Wound Assessment Pink/red;Slough;Devitalized tissue;Eschar dry   Drainage Amount Moderate (25-50%)   Drainage Description Serosanguinous;Thick;Green   Odor Malodorous/putrid   [place]-wound Assessment Intact;Blanchable erythema   Margins Defined edges   Wound Thickness Description not for Pressure Injury Full thickness     BP (!) 155/97   Pulse 89   Temp 98 °F (36.7 °C) (Temporal)   Resp 16

## 2025-04-30 NOTE — WOUND CARE
Wound Care Consultation        Assessment:     44 y.o. female with     Chronic venous HTN with ulcer of left lower extremity I87.312  Left ankle ulcer to fat level, L97.312    Recommendations:     Left leg wound  Cleanse with baby shampoo. Allow to lather. Rinse and pat dry.   Apply A&D ointment to lower leg.   In clinic apply Santyl to wound.   Cover with gauze.   Apply 2 layer calamine wrap at 25% stretch or use lite wrap at 50% stretch.   Change weekly in clinic.     Sponge baths or cast cover are recommended to keep dressing dry. Cast covers are available at most pharmacies.      Elevate leg(s) above the level of the heart when sitting.  Avoid prolonged standing in one place.   Do no get dressing/wrap wet.            Patient understood and agrees with plan. Questions answered.  Weekly visits and serial debridements also discussed.  Follow up with me in 1 week.        History of Present Illness:      Charis Moreau is a 44 y.o.  female for follow up of left  Venous  ulcer to the Ankle To Fat Layer Ulcer has been present for FEW MONTHS.  Patient Hx DVT twice in left leg and once in right leg. Her left leg always stays swollen. She does try to elevate it and also on blood thinners and diuretics.  She used compression socks in past but said did not continue use after some time.   She denies any fever/chills. She is just trying to do wound care on her own, but not sure what she should do.      Past Medical History:   Diagnosis Date    Chronic venous hypertension (idiopathic) with ulcer of left lower extremity (CODE) ([facility]) 04/30/2025    Deep vein blood clot of left lower extremity ([facility]) 2008    second 2012    Hx of blood clots     Hypertension complicating pregnancy 5/6/2011    Hypothyroidism 10/14/2024    Ill-defined condition     cellulitis in bilateral legs    Non-pressure chronic ulcer of left ankle with fat layer exposed ([facility]) 04/30/2025    Phlebitis and thrombophlebitis of unspecified site

## 2025-05-07 ENCOUNTER — HOSPITAL ENCOUNTER (OUTPATIENT)
Facility: HOSPITAL | Age: 45
Discharge: HOME | End: 2025-05-07
Attending: INTERNAL MEDICINE

## 2025-05-07 ENCOUNTER — TELEPHONE (OUTPATIENT)
Facility: HOSPITAL | Age: 45
End: 2025-05-07

## 2025-05-07 VITALS
DIASTOLIC BLOOD PRESSURE: 65 MMHG | SYSTOLIC BLOOD PRESSURE: 148 MMHG | RESPIRATION RATE: 18 BRPM | TEMPERATURE: 97.1 F | HEART RATE: 81 BPM

## 2025-05-07 DIAGNOSIS — L97.322 NON-PRESSURE CHRONIC ULCER OF LEFT ANKLE WITH FAT LAYER EXPOSED (HCC): ICD-10-CM

## 2025-05-07 DIAGNOSIS — I87.312 CHRONIC VENOUS HYPERTENSION (IDIOPATHIC) WITH ULCER OF LEFT LOWER EXTREMITY (CODE) (HCC): Primary | ICD-10-CM

## 2025-05-07 RX ORDER — GINSENG 100 MG
CAPSULE ORAL PRN
OUTPATIENT
Start: 2025-05-07

## 2025-05-07 RX ORDER — LIDOCAINE 40 MG/G
CREAM TOPICAL PRN
OUTPATIENT
Start: 2025-05-07

## 2025-05-07 RX ORDER — SODIUM CHLOR/HYPOCHLOROUS ACID 0.033 %
SOLUTION, IRRIGATION IRRIGATION PRN
OUTPATIENT
Start: 2025-05-07

## 2025-05-07 RX ORDER — BACITRACIN ZINC AND POLYMYXIN B SULFATE 500; 1000 [USP'U]/G; [USP'U]/G
OINTMENT TOPICAL PRN
OUTPATIENT
Start: 2025-05-07

## 2025-05-07 RX ORDER — LIDOCAINE 50 MG/G
OINTMENT TOPICAL PRN
OUTPATIENT
Start: 2025-05-07

## 2025-05-07 RX ORDER — MUPIROCIN 20 MG/G
OINTMENT TOPICAL PRN
OUTPATIENT
Start: 2025-05-07

## 2025-05-07 RX ORDER — CLOBETASOL PROPIONATE 0.5 MG/G
OINTMENT TOPICAL PRN
OUTPATIENT
Start: 2025-05-07

## 2025-05-07 RX ORDER — GENTAMICIN SULFATE 1 MG/G
OINTMENT TOPICAL PRN
OUTPATIENT
Start: 2025-05-07

## 2025-05-07 RX ORDER — BETAMETHASONE DIPROPIONATE 0.5 MG/G
CREAM TOPICAL PRN
OUTPATIENT
Start: 2025-05-07

## 2025-05-07 RX ORDER — TRIAMCINOLONE ACETONIDE 1 MG/G
OINTMENT TOPICAL PRN
OUTPATIENT
Start: 2025-05-07

## 2025-05-07 RX ORDER — SILVER SULFADIAZINE 10 MG/G
CREAM TOPICAL PRN
OUTPATIENT
Start: 2025-05-07

## 2025-05-07 RX ORDER — LIDOCAINE HYDROCHLORIDE 20 MG/ML
JELLY TOPICAL PRN
OUTPATIENT
Start: 2025-05-07

## 2025-05-07 RX ORDER — NEOMYCIN/BACITRACIN/POLYMYXINB 3.5-400-5K
OINTMENT (GRAM) TOPICAL PRN
OUTPATIENT
Start: 2025-05-07

## 2025-05-07 RX ORDER — LIDOCAINE HYDROCHLORIDE 40 MG/ML
SOLUTION TOPICAL PRN
OUTPATIENT
Start: 2025-05-07

## 2025-05-07 ASSESSMENT — PAIN SCALES - GENERAL: PAINLEVEL_OUTOF10: 0

## 2025-05-07 NOTE — TELEPHONE ENCOUNTER
Spoke with patient to discuss will need to go to hospital for surgical debridement as [facility]  will not be able to accommodate her being seen by the surgeon at her appointment tomorrow. Patient states walked over to [facility] after her appointment in wound care today and fell. States paramedics are with her and will be taking her to [facility] as is her preference.  verified. Questions answered.    solids

## 2025-05-07 NOTE — FLOWSHEET NOTE
05/07/25 1136   Right Leg Edema Point of Measurement   Compression Therapy Compression not ordered   Left Leg Edema Point of Measurement   Compression Therapy Compression not ordered   Wound 04/30/25 Leg Left;Anterior;Lower   Date First Assessed/Time First Assessed: 04/30/25 0959   Present on Original Admission: Yes  Location: Leg  Wound Location Orientation: Left;Anterior;Lower   Dressing Status New dressing applied   Dressing/Treatment   (santyl, gauze, ABD, roll gauze, double tubi F)   Offloading for Diabetic Foot Ulcers Offloading not required

## 2025-05-07 NOTE — FLOWSHEET NOTE
05/07/25 1022   Anesthetic   Anesthetic 4% Lidocaine Liquid Topical   Left Leg Edema Point of Measurement   Leg circumference 46.5 cm   Ankle circumference 28 cm   Peripheral Vascular   Edema Left lower extremity   LLE Edema +2;Pitting   LLE Neurovascular Assessment   Capillary Refill Less than/Equal to 3 seconds   Color Appropriate for Ethnicity   Temperature Warm   LLE Sensation  Full sensation   L Pedal Pulse +1   Wound 04/30/25 Leg Left;Anterior;Lower   Date First Assessed/Time First Assessed: 04/30/25 0959   Present on Original Admission: Yes  Location: Leg  Wound Location Orientation: Left;Anterior;Lower   Wound Image    Wound Etiology Traumatic   Dressing Status Old drainage noted   Wound Cleansed Soap and water   Offloading for Diabetic Foot Ulcers Offloading not required   Wound Length (cm) 3.8 cm   Wound Width (cm) 3.5 cm   Wound Depth (cm) 0.1 cm   Wound Surface Area (cm^2) 13.3 cm^2   Change in Wound Size % (l*w) 12.5   Wound Volume (cm^3) 1.33 cm^3   Wound Healing % 13   Wound Assessment [place]urn/red;Slough   Drainage Amount Moderate (25-50%)   Drainage Description Serosanguinous   Odor Moderate   Venice-wound Assessment Blanchable erythema;Dry/flaky;Fragile   Margins Flat/open edges   Wound Thickness Description not for Pressure Injury Full thickness   Wound 04/30/25 Leg Left;Lower;Medial   Date First Assessed/Time First Assessed: 04/30/25 0959   Present on Original Admission: Yes  Location: Leg  Wound Location Orientation: Left;Lower;Medial   Wound Image    Wound Etiology Traumatic   Dressing Status Old drainage noted   Wound Cleansed Soap and water   Offloading for Diabetic Foot Ulcers Offloading not required   Wound Length (cm) 4 cm   Wound Width (cm) 2.8 cm   Wound Depth (cm) 0.2 cm   Wound Surface Area (cm^2) 11.2 cm^2   Change in Wound Size % (l*w) -65.93   Wound Volume (cm^3) 2.24 cm^3   Wound Healing % -232   Wound Assessment Eschar moist;Pink/red   Drainage Amount Moderate (25-50%)   Drainage

## 2025-05-07 NOTE — PATIENT INSTRUCTIONS
Discharge Instructions for  [facility]  [address] [address]  Phone: [phone] Fax: [phone]    NAME:  Charis Moreau  YOB: 1980  MEDICAL RECORD NUMBER:  [id number]  DATE:  May 7, 2025    WOUND CARE ORDERS:  Left leg wounds-Cleanse with baby shampoo. Allow to lather. Rinse and pat dry. In clinic apply Santyl to wound. At home use medihoney (manuka honey, Therahoney). Cover with gauze. Secure with roll gauze and tape. Change every other day. Apply double layer tubigrip size G.    We are trying to see if a Vascular Surgeon can see you at your appointment tomorrow. If they are unable to see you or schedule an appointment then we recommend you go to the Emergency Department at a hospital to be admitted.     [facility] [phone]   Activity:  [x] Elevate leg(s) above the level of the heart when sitting.  [x] Avoid prolonged standing in one place.   [x] Do no get dressing/wrap wet.       Dietary:  [x] Diet as tolerated      [] Diabetic Diet            [x] Increase Protein: examples (Meat, cheese, eggs, greek yogurt, fish, nuts, protein shakes)          [] Frank Therapeutic Nutrition Powder  [] Other:  [] Dial a Dietician : Call [employer] at [phone] enter code (249) when prompted. M-F 9am-5pm EST.      Return Appointment:  [x] Return Appointment: With Dr. Gibbons in 1 Week(s)  [] Nurse Visit :  [] Ordered tests:     Wound Care Center Information: Should you experience any significant changes in your wound(s) or have questions about your wound care, please contact the [facility] MONDAY - THURSDAY 8:00AM - 4:00PM and FRIDAY 8:00AM - NOON at the number listed above. If you need help with your wound outside these hours and cannot wait until we are again available, contact your PCP or go to the hospital emergency room.       PLEASE NOTE: IF YOU ARE UNABLE TO OBTAIN WOUND SUPPLIES, CONTINUE TO USE THE SUPPLIES

## 2025-05-07 NOTE — WOUND CARE
Spoke with [facility] per Dr. Yoon's request that patient be seen by surgeon at her appoinment on 05/08/25. A states unable to accommodate request. Dr. Yoon notified. Will call patient to inform will need to go to hospital for surgical debridement as recommended.   
  Margins Flat/open edges 05/07/25 1022   Wound Thickness Description not for Pressure Injury Full thickness 05/07/25 1022   Number of days: 7       Wound 04/30/25 Leg Left;Lower;Medial (Active)   Wound Image   05/07/25 1022   Wound Etiology Traumatic 05/07/25 1022   Dressing Status New dressing applied 05/07/25 1136   Wound Cleansed Soap and water 05/07/25 1022   Offloading for Diabetic Foot Ulcers Offloading not required 05/07/25 1136   Wound Length (cm) 4 cm 05/07/25 1022   Wound Width (cm) 2.8 cm 05/07/25 1022   Wound Depth (cm) 0.2 cm 05/07/25 1022   Wound Surface Area (cm^2) 11.2 cm^2 05/07/25 1022   Change in Wound Size % (l*w) -65.93 05/07/25 1022   Wound Volume (cm^3) 2.24 cm^3 05/07/25 1022   Wound Healing % -232 05/07/25 1022   Wound Assessment Eschar moist;Pink/red 05/07/25 1022   Drainage Amount Moderate (25-50%) 05/07/25 1022   Drainage Description Green 05/07/25 1022   Odor Malodorous/putrid 05/07/25 1022   Venice-wound Assessment Non-blanchable erythema 05/07/25 1022   Margins Defined edges 05/07/25 1022   Wound Thickness Description not for Pressure Injury Full thickness 05/07/25 1022   Number of days: 7          D/w patient wound needs debridement to promote healing, patient understands and agrees. Very painful inability to debride properly in clinic under local anaesthesia.       Ulcer Debridement    Ulcer Number 1, 2; Left  Venous  Ankle To Fat Layer    Character of Ulcer: Necrotic    Indication for Debridement: Slough, Exudate    Pre debridement measurements: as above    Risks of procedure were discussed with patient. Consent has been signed.     Anesthetic: Lidocaine 4% topical cream     Level of Debridement: Fibrin/ Exudate/ Debris/ Biofilm     Tissue and/or Material removed: Fibrin/ Slough    Type of Tissue: Non- Viable      Pre-debridement severity: Fat Layer Exposed     Post debridement severity: Fat Layer exposed    Instrument(s) used: Curette    Bleeding controlled with:

## 2025-05-07 NOTE — FLOWSHEET NOTE
05/07/25 1136   Right Leg Edema Point of Measurement   Compression Therapy Compression not ordered   Left Leg Edema Point of Measurement   Compression Therapy Compression not ordered   Wound 04/30/25 Leg Left;Anterior;Lower   Date First Assessed/Time First Assessed: 04/30/25 0959   Present on Original Admission: Yes  Location: Leg  Wound Location Orientation: Left;Anterior;Lower   Dressing Status New dressing applied   Dressing/Treatment   (santyl, gauze, ABD, roll gauze, double tubi F)   Offloading for Diabetic Foot Ulcers Offloading not required   Wound 04/30/25 Leg Left;Lower;Medial   Date First Assessed/Time First Assessed: 04/30/25 0959   Present on Original Admission: Yes  Location: Leg  Wound Location Orientation: Left;Lower;Medial   Dressing Status New dressing applied   Dressing/Treatment   (hydrofera blue, optilock, ABD, two layer compression wrap)   Offloading for Diabetic Foot Ulcers Offloading not required

## 2025-05-19 ENCOUNTER — HOSPITAL ENCOUNTER (OUTPATIENT)
Facility: HOSPITAL | Age: 45
Discharge: HOME | End: 2025-05-19
Attending: INTERNAL MEDICINE
Payer: MEDICAID

## 2025-05-19 VITALS
HEART RATE: 75 BPM | SYSTOLIC BLOOD PRESSURE: 134 MMHG | DIASTOLIC BLOOD PRESSURE: 85 MMHG | RESPIRATION RATE: 16 BRPM | TEMPERATURE: 97.2 F

## 2025-05-19 DIAGNOSIS — L97.322 NON-PRESSURE CHRONIC ULCER OF LEFT ANKLE WITH FAT LAYER EXPOSED (HCC): Primary | ICD-10-CM

## 2025-05-19 DIAGNOSIS — I87.312 CHRONIC VENOUS HYPERTENSION (IDIOPATHIC) WITH ULCER OF LEFT LOWER EXTREMITY (CODE) (HCC): ICD-10-CM

## 2025-05-19 PROCEDURE — 11042 DBRDMT SUBQ TIS 1ST 20SQCM/<: CPT

## 2025-05-19 RX ORDER — SODIUM CHLOR/HYPOCHLOROUS ACID 0.033 %
SOLUTION, IRRIGATION IRRIGATION PRN
OUTPATIENT
Start: 2025-05-19

## 2025-05-19 RX ORDER — LIDOCAINE 40 MG/G
CREAM TOPICAL PRN
OUTPATIENT
Start: 2025-05-19

## 2025-05-19 RX ORDER — GENTAMICIN SULFATE 1 MG/G
OINTMENT TOPICAL PRN
OUTPATIENT
Start: 2025-05-19

## 2025-05-19 RX ORDER — LIDOCAINE 50 MG/G
OINTMENT TOPICAL PRN
OUTPATIENT
Start: 2025-05-19

## 2025-05-19 RX ORDER — SILVER SULFADIAZINE 10 MG/G
CREAM TOPICAL PRN
OUTPATIENT
Start: 2025-05-19

## 2025-05-19 RX ORDER — BETAMETHASONE DIPROPIONATE 0.5 MG/G
CREAM TOPICAL PRN
OUTPATIENT
Start: 2025-05-19

## 2025-05-19 RX ORDER — MUPIROCIN 20 MG/G
OINTMENT TOPICAL PRN
OUTPATIENT
Start: 2025-05-19

## 2025-05-19 RX ORDER — NEOMYCIN/BACITRACIN/POLYMYXINB 3.5-400-5K
OINTMENT (GRAM) TOPICAL PRN
OUTPATIENT
Start: 2025-05-19

## 2025-05-19 RX ORDER — GINSENG 100 MG
CAPSULE ORAL PRN
OUTPATIENT
Start: 2025-05-19

## 2025-05-19 RX ORDER — CLOBETASOL PROPIONATE 0.5 MG/G
OINTMENT TOPICAL PRN
OUTPATIENT
Start: 2025-05-19

## 2025-05-19 RX ORDER — TRIAMCINOLONE ACETONIDE 1 MG/G
OINTMENT TOPICAL PRN
OUTPATIENT
Start: 2025-05-19

## 2025-05-19 RX ORDER — BACITRACIN ZINC AND POLYMYXIN B SULFATE 500; 1000 [USP'U]/G; [USP'U]/G
OINTMENT TOPICAL PRN
OUTPATIENT
Start: 2025-05-19

## 2025-05-19 RX ORDER — LIDOCAINE HYDROCHLORIDE 40 MG/ML
SOLUTION TOPICAL PRN
OUTPATIENT
Start: 2025-05-19

## 2025-05-19 RX ORDER — LIDOCAINE HYDROCHLORIDE 20 MG/ML
JELLY TOPICAL PRN
OUTPATIENT
Start: 2025-05-19

## 2025-05-19 ASSESSMENT — PAIN SCALES - GENERAL: PAINLEVEL_OUTOF10: 6

## 2025-05-19 ASSESSMENT — PAIN DESCRIPTION - ORIENTATION: ORIENTATION: LEFT

## 2025-05-19 ASSESSMENT — PAIN DESCRIPTION - DESCRIPTORS: DESCRIPTORS: SHARP

## 2025-05-19 ASSESSMENT — PAIN DESCRIPTION - LOCATION: LOCATION: LEG

## 2025-05-19 ASSESSMENT — PAIN - FUNCTIONAL ASSESSMENT: PAIN_FUNCTIONAL_ASSESSMENT: PREVENTS OR INTERFERES SOME ACTIVE ACTIVITIES AND ADLS

## 2025-05-19 NOTE — PATIENT INSTRUCTIONS
Discharge Instructions for  [facility]  [address] [address]  Phone: [phone] Fax: [phone]    NAME:  Charis Moreau  YOB: 1980  MEDICAL RECORD NUMBER:  [id number]  DATE:  May 19, 2025    WOUND CARE ORDERS:  Left leg wounds-Cleanse with baby shampoo. Allow to lather. Rinse and pat dry. In clinic apply Santyl to wound. At home use medihoney (manuka honey, Therahoney). Cover with gauze. Secure with roll gauze and tape. Apply double layer tubi  size G.            [facility] [phone]   Activity:  [x] Elevate leg(s) above the level of the heart when sitting.  [x] Avoid prolonged standing in one place.   [x] Do no get dressing/wrap wet.       Dietary:  [x] Diet as tolerated      [] Diabetic Diet            [x] Increase Protein: examples (Meat, cheese, eggs, greek yogurt, fish, nuts, protein shakes)          [] Frank Therapeutic Nutrition Powder  [] Other:  [] Dial a Dietician : Call [employer] at [phone] enter code (249) when prompted. M-F 9am-5pm EST.      Return Appointment:  [x] Return Appointment: With Dr. Gibbons in 2 Week(s)  [] Nurse Visit :  [] Ordered tests:     Wound Care Center Information: Should you experience any significant changes in your wound(s) or have questions about your wound care, please contact the [facility] MONDAY - THURSDAY 8:00AM - 4:00PM and FRIDAY 8:00AM - NOON at the number listed above. If you need help with your wound outside these hours and cannot wait until we are again available, contact your PCP or go to the hospital emergency room.       PLEASE NOTE: IF YOU ARE UNABLE TO OBTAIN WOUND SUPPLIES, CONTINUE TO USE THE SUPPLIES YOU HAVE AVAILABLE UNTIL YOU ARE ABLE TO REACH US. IT IS MOST IMPORTANT TO KEEP THE WOUND COVERED AT ALL TIMES.     Physician Signature:_______________________    Date: ___________ Time:  ____________

## 2025-05-19 NOTE — FLOWSHEET NOTE
05/19/25 1052   Left Leg Edema Point of Measurement   Compression Therapy Tubular elastic support bandage   Tubular Elastic Support Bandage Compression Pressure Medium   Wound 04/30/25 Leg Left;Lower;Medial Cluster   Date First Assessed/Time First Assessed: 04/30/25 0959   Present on Original Admission: Yes  Location: Leg  Wound Location Orientation: Left;Lower;Medial  Wound Description (Comments): Cluster   Dressing Status New dressing applied   Dressing/Treatment Gauze dressing/dressing sponge;Roll gauze;Tape/Soft cloth adhesive tape;Tubular bandage;Other (comment)  (Scott)   Wound 05/19/25 Leg Left;Proximal;Anterior   Date First Assessed/Time First Assessed: 05/19/25 0900   Present on Original Admission: Yes  Wound Approximate Age at First Assessment (Weeks): 2 weeks  Primary Wound Type: Pressure Injury  Location: Leg  Wound Location Orientation: Left;Proximal;Ante...   Dressing Status New dressing applied   Dressing/Treatment Gauze dressing/dressing sponge;Roll gauze;Tape/Soft cloth adhesive tape;Other (comment);Tubular bandage  (Santyl)

## 2025-05-21 NOTE — PROGRESS NOTES
[facility]   [facility]   Medical Staff Progress Note     Charis Moreau  MEDICAL RECORD NUMBER:  [id number]  AGE: 44 y.o.   GENDER: female  : 1980  EPISODE DATE:  2025    Chief complaint and reason for visit:     Chief Complaint   Patient presents for follow up of her chronic venous ulcers of the left lower leg    Wound Check     Left leg      Patient presenting for follow up evaluation of wound(s) per chief complaint.      Subjective and ROS:  Ms Charis Moreau is a 45 yo female, with a history of DVT x2 of the left leg and once in right leg, on anticoagulants,  has been followed by Dr Yoon for her chronic venous ulcers involving the left lower leg, most recently after presenting with painful ankle and allergic rash secondary to Calamine wraps.  She was  treated with  Santyl to wound and applied Tubigrip G for compression.  Her care was transferred to this clinic due to her busy schedule caring for her 6 children, some with disabilities.     On follow up today, Ms Moreau returns with complaints of a new unruptured blister in the ipsilateral leg, more proximal to the other wounds and is concerned it has not drained. Denies  any pain or fever or chills.     She saw vascular surgery last week and had studies done, results pending.  Her left lower leg was treated with Zinc wrapped she she had on since today for compression and management.      History of Wound Context: Per original history and physical on this patient. Changes in history since last evaluation: none    Medical Decision Making:     Problem List Items Addressed This Visit          Circulatory    Chronic venous hypertension (idiopathic) with ulcer of left lower extremity (CODE) (HCC)       Other    Non-pressure chronic ulcer of left ankle with fat layer exposed (HCC) - Primary       Wounds and Treatment Plan:  Left lower leg wounds - 2 - Lower medial cluster, is measuring about

## 2025-06-16 ENCOUNTER — HOSPITAL ENCOUNTER (OUTPATIENT)
Facility: HOSPITAL | Age: 45
Discharge: HOME | End: 2025-06-16
Attending: INTERNAL MEDICINE
Payer: MEDICAID

## 2025-06-16 VITALS
TEMPERATURE: 97.7 F | SYSTOLIC BLOOD PRESSURE: 139 MMHG | HEART RATE: 94 BPM | DIASTOLIC BLOOD PRESSURE: 93 MMHG | RESPIRATION RATE: 18 BRPM

## 2025-06-16 DIAGNOSIS — L97.322 NON-PRESSURE CHRONIC ULCER OF LEFT ANKLE WITH FAT LAYER EXPOSED (HCC): ICD-10-CM

## 2025-06-16 DIAGNOSIS — I87.312 CHRONIC VENOUS HYPERTENSION (IDIOPATHIC) WITH ULCER OF LEFT LOWER EXTREMITY (CODE) (HCC): Primary | ICD-10-CM

## 2025-06-16 PROCEDURE — 99213 OFFICE O/P EST LOW 20 MIN: CPT | Performed by: SURGERY

## 2025-06-16 PROCEDURE — 11042 DBRDMT SUBQ TIS 1ST 20SQCM/<: CPT | Performed by: SURGERY

## 2025-06-16 PROCEDURE — 11042 DBRDMT SUBQ TIS 1ST 20SQCM/<: CPT

## 2025-06-16 RX ORDER — SODIUM CHLOR/HYPOCHLOROUS ACID 0.033 %
SOLUTION, IRRIGATION IRRIGATION PRN
OUTPATIENT
Start: 2025-06-16

## 2025-06-16 RX ORDER — LIDOCAINE 50 MG/G
OINTMENT TOPICAL PRN
OUTPATIENT
Start: 2025-06-16

## 2025-06-16 RX ORDER — BACITRACIN ZINC AND POLYMYXIN B SULFATE 500; 1000 [USP'U]/G; [USP'U]/G
OINTMENT TOPICAL PRN
OUTPATIENT
Start: 2025-06-16

## 2025-06-16 RX ORDER — LIDOCAINE HYDROCHLORIDE 20 MG/ML
JELLY TOPICAL PRN
OUTPATIENT
Start: 2025-06-16

## 2025-06-16 RX ORDER — SILVER SULFADIAZINE 10 MG/G
CREAM TOPICAL PRN
OUTPATIENT
Start: 2025-06-16

## 2025-06-16 RX ORDER — GENTAMICIN SULFATE 1 MG/G
OINTMENT TOPICAL PRN
OUTPATIENT
Start: 2025-06-16

## 2025-06-16 RX ORDER — LIDOCAINE 40 MG/G
CREAM TOPICAL PRN
OUTPATIENT
Start: 2025-06-16

## 2025-06-16 RX ORDER — TRIAMCINOLONE ACETONIDE 1 MG/G
OINTMENT TOPICAL PRN
OUTPATIENT
Start: 2025-06-16

## 2025-06-16 RX ORDER — CLOBETASOL PROPIONATE 0.5 MG/G
OINTMENT TOPICAL PRN
OUTPATIENT
Start: 2025-06-16

## 2025-06-16 RX ORDER — GINSENG 100 MG
CAPSULE ORAL PRN
OUTPATIENT
Start: 2025-06-16

## 2025-06-16 RX ORDER — BETAMETHASONE DIPROPIONATE 0.5 MG/G
CREAM TOPICAL PRN
OUTPATIENT
Start: 2025-06-16

## 2025-06-16 RX ORDER — NEOMYCIN/BACITRACIN/POLYMYXINB 3.5-400-5K
OINTMENT (GRAM) TOPICAL PRN
OUTPATIENT
Start: 2025-06-16

## 2025-06-16 RX ORDER — LIDOCAINE HYDROCHLORIDE 40 MG/ML
SOLUTION TOPICAL PRN
OUTPATIENT
Start: 2025-06-16

## 2025-06-16 RX ORDER — MUPIROCIN 2 %
OINTMENT (GRAM) TOPICAL PRN
OUTPATIENT
Start: 2025-06-16

## 2025-06-16 ASSESSMENT — PAIN SCALES - GENERAL: PAINLEVEL_OUTOF10: 6

## 2025-06-16 ASSESSMENT — PAIN DESCRIPTION - LOCATION: LOCATION: LEG

## 2025-06-16 ASSESSMENT — PAIN DESCRIPTION - DESCRIPTORS: DESCRIPTORS: SHARP;DISCOMFORT

## 2025-06-16 ASSESSMENT — PAIN DESCRIPTION - ORIENTATION: ORIENTATION: LEFT

## 2025-06-16 NOTE — PROGRESS NOTES
WOUND CARE PROGRESS       Subjective:     Patient   Patient presenting for follow up evaluation of wound(s) per chief complaint.       Subjective and ROS:  Ms Charis Moreau is a 43 yo female, with a history of DVT x2 of the left leg and once in right leg, on anticoagulants,  has been followed by Dr Yoon for her chronic venous ulcers involving the left lower leg, most recently after presenting with painful ankle and allergic rash secondary to Calamine wraps.  She was  treated with  Santyl to wound and applied Tubigrip G for compression.  Her care was transferred to this clinic due to her busy schedule caring for her 6 children, some with disabilities.      On follow up today, Ms Moreau returns with complaints of a new unruptured blister in the ipsilateral leg, more proximal to the other wounds and is concerned it has not drained. Denies  any pain or fever or chills.      She saw vascular surgery last week and had studies done, results pending.  Her left lower leg was treated with Zinc wrapped she she had on since today for compression and management.    WOUND CARE ORDERS:  Left leg wounds-Cleanse with baby shampoo. Allow to lather. Rinse and pat dry. In clinic apply Santyl to wound. At home use medihoney (manuka honey, Therahoney). Cover with gauze. Secure with roll gauze and tape. Apply double layer tubi  size G.     Follow-up June 16, 2025: Patient is doing okay, has 6 children at home that occupy a lot of her time apparently multiple disabilities with the children.  Vascular surgery is recommended venous ablation to her left calf but patient is unable to due to this due to family commitment issues.  Patient has canceled multiple appointments in the past.  She sounds like she is not able to offload her legs very much due to family responsibilities.  No history of smoking.    Review of Systems   Constitutional:         See HPI   All other systems reviewed and are negative.      Objective:     There

## 2025-06-16 NOTE — FLOWSHEET NOTE
06/16/25 0957   Left Leg Edema Point of Measurement   Compression Therapy Tubular elastic support bandage   Tubular Elastic Support Bandage Compression Pressure Medium   Wound 04/30/25 Leg Left;Lower;Medial Cluster   Date First Assessed/Time First Assessed: 04/30/25 0959   Present on Original Admission: Yes  Location: Leg  Wound Location Orientation: Left;Lower;Medial  Wound Description (Comments): Cluster   Wound Etiology Traumatic   Dressing Status New dressing applied   Dressing/Treatment Gauze dressing/dressing sponge;Roll gauze;Tape change  (Santyl)   Offloading for Diabetic Foot Ulcers Offloading not required   Wound 05/19/25 Leg Left;Proximal;Anterior   Date First Assessed/Time First Assessed: 05/19/25 0900   Present on Original Admission: Yes  Wound Approximate Age at First Assessment (Weeks): 2 weeks  Primary Wound Type: Pressure Injury  Location: Leg  Wound Location Orientation: Left;Proximal;Ante...   Wound Etiology Other   Dressing Status New dressing applied   Dressing/Treatment Gauze dressing/dressing sponge;Roll gauze;Tape change  (Santyl)   Offloading for Diabetic Foot Ulcers Offloading not required     Discharge Condition: Stable    Pain: 0    Ambulatory Status: None    Discharge Destination: home    Transportation:car    Accompanied by: SELF    Discharge instructions reviewed with SELF and copy or written instructions have been provided. All questions/concerns have been addressed at this time.

## 2025-06-16 NOTE — FLOWSHEET NOTE
06/16/25 0924   Anesthetic   Anesthetic 4% Lidocaine Liquid Topical   Left Leg Edema Point of Measurement   Leg circumference 48.5 cm   Ankle circumference 27 cm   Compression Therapy Tubular elastic support bandage   Tubular Elastic Support Bandage Compression Pressure Low   Peripheral Vascular   LLE Edema +1;Pitting   LLE Neurovascular Assessment   Capillary Refill Less than/Equal to 3 seconds   Color Yellow-Brown/Hemosiderin Staining   Temperature Warm   LLE Sensation  Full sensation   L Pedal Pulse +1   Wound 04/30/25 Leg Left;Lower;Medial Cluster   Date First Assessed/Time First Assessed: 04/30/25 0959   Present on Original Admission: Yes  Location: Leg  Wound Location Orientation: Left;Lower;Medial  Wound Description (Comments): Cluster   Wound Image    Wound Etiology Traumatic   Dressing Status Old drainage noted   Wound Cleansed Cleansed with saline   Offloading for Diabetic Foot Ulcers Offloading not required   Wound Length (cm) 4 cm   Wound Width (cm) 2.6 cm   Wound Depth (cm) 0.2 cm   Wound Surface Area (cm^2) 10.4 cm^2   Change in Wound Size % (l*w) -54.07   Wound Volume (cm^3) 2.08 cm^3   Wound Healing % -208   Wound Assessment Slough;Pink/red;Granulation tissue   Drainage Amount Moderate (25-50%)   Drainage Description Serosanguinous   Odor Mild   [place]-wound Assessment Hyperpigmented;Hemosiderin staining (brown yellow);Dry/flaky   Margins Undefined edges   Wound Thickness Description not for Pressure Injury Full thickness   Wound 05/19/25 Leg Left;Proximal;Anterior   Date First Assessed/Time First Assessed: 05/19/25 0900   Present on Original Admission: Yes  Wound Approximate Age at First Assessment (Weeks): 2 weeks  Primary Wound Type: Pressure Injury  Location: Leg  Wound Location Orientation: Left;Proximal;Ante...   Wound Image    Dressing Status Other (Comment)  (tae)   Wound Cleansed Not Cleansed   Offloading for Diabetic Foot Ulcers Offloading not required   Wound Length (cm) 0.1 cm   Wound

## 2025-06-16 NOTE — PATIENT INSTRUCTIONS
Discharge Instructions for  [facility]  [address] [address]  Phone: [phone] Fax: [phone]    NAME:  Charis Moreau  YOB: 1980  MEDICAL RECORD NUMBER:  [id number]  DATE:  June 16, 2025    WOUND CARE ORDERS:  Left leg wounds - Cleanse with baby shampoo. Allow to lather. Rinse and pat dry. In clinic, apply Santyl to wound. At home use medihoney gel(manuka honey, Therahoney). Cover with gauze. Secure with roll gauze and tape. Apply double layer tubigrips size G.     Obtain 15-20 mmHg of pressure compression stockings.     Activity:  [x] Elevate leg(s) above the level of the heart when sitting.  [x] Avoid prolonged standing in one place.   [x] Do no get dressing/wrap wet.       Dietary:  [x] Diet as tolerated      [] Diabetic Diet            [x] Increase Protein: examples (Meat, cheese, eggs, greek yogurt, fish, nuts, protein shakes)          [] Frank Therapeutic Nutrition Powder      Return Appointment:  [x] Return Appointment: With Dr. Gibbons in 2 weeks.   [] Nurse Visit :  [] Ordered tests:     Wound Care Center Information: Should you experience any significant changes in your wound(s) or have questions about your wound care, please contact the [facility] MONDAY - THURSDAY 8:00AM - 4:00PM and FRIDAY 8:00AM - NOON at the number listed above. If you need help with your wound outside these hours and cannot wait until we are again available, contact your PCP or go to the hospital emergency room.       PLEASE NOTE: IF YOU ARE UNABLE TO OBTAIN WOUND SUPPLIES, CONTINUE TO USE THE SUPPLIES YOU HAVE AVAILABLE UNTIL YOU ARE ABLE TO REACH US. IT IS MOST IMPORTANT TO KEEP THE WOUND COVERED AT ALL TIMES.     Physician Signature:_______________________    Date: ___________ Time:  ____________

## 2025-06-22 DIAGNOSIS — I10 ESSENTIAL (PRIMARY) HYPERTENSION: ICD-10-CM

## 2025-06-22 RX ORDER — HYDROCHLOROTHIAZIDE 12.5 MG/1
TABLET ORAL
Qty: 30 TABLET | Refills: 0 | OUTPATIENT
Start: 2025-06-22

## 2025-06-30 ENCOUNTER — HOSPITAL ENCOUNTER (OUTPATIENT)
Facility: HOSPITAL | Age: 45
Discharge: HOME | End: 2025-06-30
Attending: INTERNAL MEDICINE
Payer: MEDICAID

## 2025-06-30 VITALS
HEART RATE: 99 BPM | SYSTOLIC BLOOD PRESSURE: 145 MMHG | DIASTOLIC BLOOD PRESSURE: 100 MMHG | TEMPERATURE: 98.1 F | RESPIRATION RATE: 18 BRPM

## 2025-06-30 DIAGNOSIS — L97.322 NON-PRESSURE CHRONIC ULCER OF LEFT ANKLE WITH FAT LAYER EXPOSED (HCC): ICD-10-CM

## 2025-06-30 DIAGNOSIS — I87.312 CHRONIC VENOUS HYPERTENSION (IDIOPATHIC) WITH ULCER OF LEFT LOWER EXTREMITY (CODE) (HCC): Primary | ICD-10-CM

## 2025-06-30 PROCEDURE — 11042 DBRDMT SUBQ TIS 1ST 20SQCM/<: CPT

## 2025-06-30 RX ORDER — BACITRACIN ZINC AND POLYMYXIN B SULFATE 500; 1000 [USP'U]/G; [USP'U]/G
OINTMENT TOPICAL PRN
OUTPATIENT
Start: 2025-06-30

## 2025-06-30 RX ORDER — LIDOCAINE 40 MG/G
CREAM TOPICAL PRN
OUTPATIENT
Start: 2025-06-30

## 2025-06-30 RX ORDER — LIDOCAINE HYDROCHLORIDE 40 MG/ML
SOLUTION TOPICAL PRN
OUTPATIENT
Start: 2025-06-30

## 2025-06-30 RX ORDER — LIDOCAINE 50 MG/G
OINTMENT TOPICAL PRN
OUTPATIENT
Start: 2025-06-30

## 2025-06-30 RX ORDER — MUPIROCIN 2 %
OINTMENT (GRAM) TOPICAL PRN
OUTPATIENT
Start: 2025-06-30

## 2025-06-30 RX ORDER — LIDOCAINE HYDROCHLORIDE 20 MG/ML
JELLY TOPICAL PRN
OUTPATIENT
Start: 2025-06-30

## 2025-06-30 RX ORDER — TRIAMCINOLONE ACETONIDE 1 MG/G
OINTMENT TOPICAL PRN
OUTPATIENT
Start: 2025-06-30

## 2025-06-30 RX ORDER — SODIUM CHLOR/HYPOCHLOROUS ACID 0.033 %
SOLUTION, IRRIGATION IRRIGATION PRN
OUTPATIENT
Start: 2025-06-30

## 2025-06-30 RX ORDER — GINSENG 100 MG
CAPSULE ORAL PRN
OUTPATIENT
Start: 2025-06-30

## 2025-06-30 RX ORDER — NEOMYCIN/BACITRACIN/POLYMYXINB 3.5-400-5K
OINTMENT (GRAM) TOPICAL PRN
OUTPATIENT
Start: 2025-06-30

## 2025-06-30 RX ORDER — SILVER SULFADIAZINE 10 MG/G
CREAM TOPICAL PRN
OUTPATIENT
Start: 2025-06-30

## 2025-06-30 RX ORDER — BETAMETHASONE DIPROPIONATE 0.5 MG/G
CREAM TOPICAL PRN
OUTPATIENT
Start: 2025-06-30

## 2025-06-30 RX ORDER — GENTAMICIN SULFATE 1 MG/G
OINTMENT TOPICAL PRN
OUTPATIENT
Start: 2025-06-30

## 2025-06-30 RX ORDER — CLOBETASOL PROPIONATE 0.5 MG/G
OINTMENT TOPICAL PRN
OUTPATIENT
Start: 2025-06-30

## 2025-06-30 ASSESSMENT — PAIN DESCRIPTION - PAIN TYPE: TYPE: CHRONIC PAIN

## 2025-06-30 ASSESSMENT — PAIN DESCRIPTION - DESCRIPTORS: DESCRIPTORS: ACHING;CRUSHING;DISCOMFORT

## 2025-06-30 ASSESSMENT — PAIN SCALES - GENERAL: PAINLEVEL_OUTOF10: 5

## 2025-06-30 ASSESSMENT — PAIN DESCRIPTION - FREQUENCY: FREQUENCY: INTERMITTENT

## 2025-06-30 ASSESSMENT — PAIN DESCRIPTION - LOCATION: LOCATION: LEG

## 2025-06-30 ASSESSMENT — PAIN DESCRIPTION - ORIENTATION: ORIENTATION: LEFT

## 2025-06-30 NOTE — PATIENT INSTRUCTIONS
Discharge Instructions for  [facility]  [address] [address]  Phone: [phone] Fax: [phone]    NAME:  Charis Moreau  YOB: 1980  MEDICAL RECORD NUMBER:  [id number]  DATE:  June 30, 2025    WOUND CARE ORDERS:  Left leg wounds - Cleanse with baby shampoo. Allow to lather. Rinse and pat dry. In clinic, apply Santyl to wound. At home use medihoney gel(manuka honey, Therahoney). Cover with gauze. Secure with roll gauze and tape. Apply double layer tubigrips size G.     Obtain 15-20 mmHg of pressure compression stockings.     Activity:  [x] Elevate leg(s) above the level of the heart when sitting.  [x] Avoid prolonged standing in one place.   [x] Do no get dressing/wrap wet.       Dietary:  [x] Diet as tolerated      [] Diabetic Diet            [x] Increase Protein: examples (Meat, cheese, eggs, greek yogurt, fish, nuts, protein shakes)          [] Frank Therapeutic Nutrition Powder      Return Appointment:  [x] Return Appointment: With Dr. Gibbons in 2 weeks.   [] Nurse Visit :  [] Ordered tests:     Wound Care Center Information: Should you experience any significant changes in your wound(s) or have questions about your wound care, please contact the [facility] MONDAY - THURSDAY 8:00AM - 4:00PM and FRIDAY 8:00AM - NOON at the number listed above. If you need help with your wound outside these hours and cannot wait until we are again available, contact your PCP or go to the hospital emergency room.       PLEASE NOTE: IF YOU ARE UNABLE TO OBTAIN WOUND SUPPLIES, CONTINUE TO USE THE SUPPLIES YOU HAVE AVAILABLE UNTIL YOU ARE ABLE TO REACH US. IT IS MOST IMPORTANT TO KEEP THE WOUND COVERED AT ALL TIMES.     Physician Signature:_______________________    Date: ___________ Time:  ____________

## 2025-06-30 NOTE — FLOWSHEET NOTE
06/30/25 0923   Anesthetic   Anesthetic 4% Lidocaine Liquid Topical   Left Leg Edema Point of Measurement   Leg circumference 47 cm   Ankle circumference 28 cm   Compression Therapy Tubular elastic support bandage   Tubular Elastic Support Bandage Compression Pressure Medium   LLE Neurovascular Assessment   Capillary Refill Less than/Equal to 3 seconds   Color Yellow-Brown/Hemosiderin Staining   Temperature Warm   LLE Sensation  Full sensation   L Pedal Pulse +1   Wound 05/19/25 Leg Left;Proximal;Anterior   Date First Assessed/Time First Assessed: 05/19/25 0900   Present on Original Admission: Yes  Wound Approximate Age at First Assessment (Weeks): 2 weeks  Primary Wound Type: Pressure Injury  Location: Leg  Wound Location Orientation: Left;Proximal;Ante...   Wound Image    Wound Etiology Traumatic   Dressing Status Old drainage noted   Wound Cleansed Cleansed with saline   Wound Length (cm) 0.4 cm   Wound Width (cm) 0.5 cm   Wound Depth (cm) 0.1 cm   Wound Surface Area (cm^2) 0.2 cm^2   Change in Wound Size % (l*w) 86.01   Wound Volume (cm^3) 0.02 cm^3   Wound Healing % 86   Wound Assessment Pink/red   Drainage Amount Scant (moist but unmeasurable)   Drainage Description Serous   Odor None   [place]-wound Assessment Blanchable erythema   Margins Defined edges   Wound Thickness Description not for Pressure Injury Partial thickness   Wound 04/30/25 Leg Left;Lower;Medial Cluster   Date First Assessed/Time First Assessed: 04/30/25 0959   Present on Original Admission: Yes  Location: Leg  Wound Location Orientation: Left;Lower;Medial  Wound Description (Comments): Cluster   Wound Image    Wound Etiology Traumatic   Dressing Status Old drainage noted   Wound Cleansed Cleansed with saline   Wound Length (cm) 4 cm   Wound Width (cm) 2.6 cm   Wound Depth (cm) 0.1 cm   Wound Surface Area (cm^2) 10.4 cm^2   Change in Wound Size % (l*w) -54.07   Wound Volume (cm^3) 1.04 cm^3   Wound Healing % -54   Wound Assessment

## 2025-07-01 NOTE — PROGRESS NOTES
Normocephalic and atraumatic  Eyes: Extraocular eye movements intact, conjunctivae normal, and sclera anicteric  ENT: Hearing grossly normal bilaterally. Normal appearance  Respiratory: no chest wall tenderness. no respiratory distress  GI: Abdomen non-tender and benign  Musculoskeletal: Baseline range of motion in joints. Nontender calves. No cyanosis. Edema 1+.  Left lower leg ulcers:  Mixed response; larger wound unchanged with size unchanged. Drain about the same; the other lesion is smaller and doing better.  See images above and description  Neurologic: Speech normal. At baseline without new focal deficits. Mental status normal or at baseline    PAST MEDICAL HISTORY        Diagnosis Date    Chronic venous hypertension (idiopathic) with ulcer of left lower extremity (CODE) ([facility]) 2025    Deep vein blood clot of left lower extremity ([facility]) 2008    second 2012    Hx of blood clots     Hypertension complicating pregnancy 2011    Hypothyroidism 10/14/2024    Ill-defined condition     cellulitis in bilateral legs    Non-pressure chronic ulcer of left ankle with fat layer exposed ([facility]) 2025    Phlebitis and thrombophlebitis of unspecified site     bilateral ankles    Pregnancy     7th child now,     Thromboembolus ([facility])        PAST SURGICAL HISTORY    Past Surgical History:   Procedure Laterality Date     SECTION           SECTION          GYN      Vaginal x4 (yrs: , , , , , , )    OTHER SURGICAL HISTORY      wisdom teeth removed at age 20.       FAMILY HISTORY    Family History   Problem Relation Age of Onset    Heart Disease Father     Hypertension Father     Cancer Maternal Grandmother         breast    Breast Cancer Maternal Grandmother     Alzheimer's Disease Mother     Rheum Arthritis Mother     Diabetes Mother     Mental Retardation Other        SOCIAL HISTORY    Social History     Tobacco Use    Smoking status: Former

## 2025-07-21 ENCOUNTER — HOSPITAL ENCOUNTER (OUTPATIENT)
Facility: HOSPITAL | Age: 45
Discharge: HOME | End: 2025-07-21
Attending: INTERNAL MEDICINE
Payer: MEDICAID

## 2025-07-21 VITALS — HEART RATE: 104 BPM | TEMPERATURE: 98 F | SYSTOLIC BLOOD PRESSURE: 135 MMHG | DIASTOLIC BLOOD PRESSURE: 93 MMHG

## 2025-07-21 DIAGNOSIS — I87.312 CHRONIC VENOUS HYPERTENSION (IDIOPATHIC) WITH ULCER OF LEFT LOWER EXTREMITY (CODE) (HCC): Primary | ICD-10-CM

## 2025-07-21 DIAGNOSIS — L97.322 NON-PRESSURE CHRONIC ULCER OF LEFT ANKLE WITH FAT LAYER EXPOSED (HCC): ICD-10-CM

## 2025-07-21 PROCEDURE — 99213 OFFICE O/P EST LOW 20 MIN: CPT | Performed by: SURGERY

## 2025-07-21 PROCEDURE — 99213 OFFICE O/P EST LOW 20 MIN: CPT

## 2025-07-21 ASSESSMENT — PAIN DESCRIPTION - ORIENTATION: ORIENTATION: LEFT

## 2025-07-21 ASSESSMENT — PAIN DESCRIPTION - DESCRIPTORS: DESCRIPTORS: BURNING

## 2025-07-21 NOTE — FLOWSHEET NOTE
07/21/25 1040   Wound 04/30/25 Leg Left;Lower;Medial Cluster   Date First Assessed/Time First Assessed: 04/30/25 0959   Present on Original Admission: Yes  Location: Leg  Wound Location Orientation: Left;Lower;Medial  Wound Description (Comments): Cluster   Dressing/Treatment Gauze dressing/dressing sponge;Roll gauze;Tape/Soft cloth adhesive tape;Other (comment);Tubular bandage  (santyl)

## 2025-07-21 NOTE — FLOWSHEET NOTE
07/21/25 0959   Left Leg Edema Point of Measurement   Leg circumference 46.5 cm   Ankle circumference 27.5 cm   Compression Therapy Tubular elastic support bandage   Tubular Elastic Support Bandage Compression Pressure Medium   LLE Neurovascular Assessment   Capillary Refill Less than/Equal to 3 seconds   Color Yellow-Brown/Hemosiderin Staining   Temperature Warm   L Pedal Pulse +1   Wound 05/19/25 Leg Left;Proximal;Anterior   Date First Assessed/Time First Assessed: 05/19/25 0900   Present on Original Admission: Yes  Wound Approximate Age at First Assessment (Weeks): 2 weeks  Primary Wound Type: Pressure Injury  Location: Leg  Wound Location Orientation: Left;Proximal;Ante...   Wound Image    Wound Etiology Traumatic   Dressing Status Old drainage noted   Wound Cleansed Cleansed with saline   Offloading for Diabetic Foot Ulcers Offloading not required   Wound Length (cm) 0.1 cm   Wound Width (cm) 0.1 cm   Wound Depth (cm) 0.1 cm   Wound Surface Area (cm^2) 0.01 cm^2   Change in Wound Size % (l*w) 99.3   Wound Volume (cm^3) 0.001 cm^3   Wound Healing % 99   Wound Assessment Epithelialization   Drainage Amount None (dry)   Odor None   Wound 04/30/25 Leg Left;Lower;Medial Cluster   Date First Assessed/Time First Assessed: 04/30/25 0959   Present on Original Admission: Yes  Location: Leg  Wound Location Orientation: Left;Lower;Medial  Wound Description (Comments): Cluster   Wound Image    Wound Etiology Traumatic   Dressing Status Old drainage noted   Wound Cleansed Cleansed with saline   Offloading for Diabetic Foot Ulcers Offloading not required   Wound Length (cm) 3.6 cm   Wound Width (cm) 2.4 cm   Wound Depth (cm) 0.2 cm   Wound Surface Area (cm^2) 8.64 cm^2   Change in Wound Size % (l*w) -28   Wound Volume (cm^3) 1.728 cm^3   Wound Healing % -156   Wound Assessment Pink/red;Slough;Devitalized tissue   Drainage Amount Moderate (25-50%)   Drainage Description Serosanguinous   Odor Mild   [place]-wound Assessment

## 2025-07-21 NOTE — PROGRESS NOTES
WOUND CARE PROGRESS       Subjective:     Patient   Chief Complaint   Patient presents  for follow up of her chronic venous ulcers of the left lower leg     Wound Check       Left lower leg.      Patient presenting for follow up evaluation of wound(s) per chief complaint.       Subjective and ROS:   Ms Charis Moreau is a 45 yo female, with a history of DVT x2 of the left leg and once in right leg, on anticoagulants,  has been followed by Dr Yoon for her chronic venous ulcers involving the left lower leg, most recently after presenting with painful ankle and allergic rash secondary to Calamine wraps.  She was  treated with  Santyl to wound and applied Tubigrip G for compression.  Her care was transferred to this clinic due to her busy schedule caring for her 6 children, some with disabilities.      On follow up today, Ms Moreau returns with complaints of a new unruptured blister in the ipsilateral leg, more proximal to the other wounds and is concerned it has not drained. Denies  any pain or fever or chills.      She saw vascular surgery last week and had studies done, results pending.  Her left lower leg was treated with Zinc wrapped she she had on since today for compression and management.       June 16, 2025-  Patient seen by Dr Wan:  Patient is doing okay, has 6 children at home that occupy a lot of her time apparently multiple disabilities with the children. Vascular surgery is recommended venous ablation to her left calf but patient is unable to due to this due to family commitment issues. Patient has canceled multiple appointments in the past. She sounds like she is not able to offload her legs very much due to family responsibilities. No history of smoking.      Wounds were debrided.      Would like to come consider compression wrap 2 layer but patient noncompliant with follow-up     Encouraged offloading, and compliance, and patient to follow-up with vascular surgery regarding venous ablation

## 2025-07-21 NOTE — PATIENT INSTRUCTIONS
Discharge Instructions for  [facility]  [address] [address]  Phone: [phone] Fax: [phone]    NAME:  Charis Moreau  YOB: 1980  MEDICAL RECORD NUMBER:  [id number]  DATE:  July 21, 2025    WOUND CARE ORDERS:  Left leg wounds - Cleanse with baby shampoo. Allow to lather. Rinse and pat dry. In clinic, apply Santyl to wound. At home use medihoney gel(manuka honey, Therahoney). Cover with gauze. Secure with roll gauze and tape. Apply double layer tubigrips size G.     Obtain 15-20 mmHg of pressure compression stockings.     Activity:  [x] Elevate leg(s) above the level of the heart when sitting.  [x] Avoid prolonged standing in one place.   [x] Do no get dressing/wrap wet.       Dietary:  [x] Diet as tolerated      [] Diabetic Diet            [x] Increase Protein: examples (Meat, cheese, eggs, greek yogurt, fish, nuts, protein shakes)          [] Frank Therapeutic Nutrition Powder      Return Appointment:  [x] Return Appointment: With Dr. Gibbons in 2 weeks.   [] Nurse Visit :  [] Ordered tests:     Wound Care Center Information: Should you experience any significant changes in your wound(s) or have questions about your wound care, please contact the [facility] MONDAY - THURSDAY 8:00AM - 4:00PM and FRIDAY 8:00AM - NOON at the number listed above. If you need help with your wound outside these hours and cannot wait until we are again available, contact your PCP or go to the hospital emergency room.       PLEASE NOTE: IF YOU ARE UNABLE TO OBTAIN WOUND SUPPLIES, CONTINUE TO USE THE SUPPLIES YOU HAVE AVAILABLE UNTIL YOU ARE ABLE TO REACH US. IT IS MOST IMPORTANT TO KEEP THE WOUND COVERED AT ALL TIMES.     Physician Signature:_______________________    Date: ___________ Time:  ____________

## 2025-08-18 ENCOUNTER — HOSPITAL ENCOUNTER (OUTPATIENT)
Facility: HOSPITAL | Age: 45
Discharge: HOME | End: 2025-08-18
Attending: INTERNAL MEDICINE
Payer: MEDICAID

## 2025-08-18 VITALS
DIASTOLIC BLOOD PRESSURE: 84 MMHG | SYSTOLIC BLOOD PRESSURE: 139 MMHG | RESPIRATION RATE: 18 BRPM | HEART RATE: 93 BPM | TEMPERATURE: 97.9 F

## 2025-08-18 DIAGNOSIS — I87.312 CHRONIC VENOUS HYPERTENSION (IDIOPATHIC) WITH ULCER OF LEFT LOWER EXTREMITY (CODE) (HCC): ICD-10-CM

## 2025-08-18 DIAGNOSIS — L97.322 NON-PRESSURE CHRONIC ULCER OF LEFT ANKLE WITH FAT LAYER EXPOSED (HCC): Primary | ICD-10-CM

## 2025-08-18 PROCEDURE — 11042 DBRDMT SUBQ TIS 1ST 20SQCM/<: CPT

## 2025-08-18 RX ORDER — SILVER SULFADIAZINE 10 MG/G
CREAM TOPICAL PRN
OUTPATIENT
Start: 2025-08-18

## 2025-08-18 RX ORDER — CLOBETASOL PROPIONATE 0.5 MG/G
OINTMENT TOPICAL PRN
OUTPATIENT
Start: 2025-08-18

## 2025-08-18 RX ORDER — LIDOCAINE 40 MG/G
CREAM TOPICAL PRN
OUTPATIENT
Start: 2025-08-18

## 2025-08-18 RX ORDER — LIDOCAINE HYDROCHLORIDE 20 MG/ML
JELLY TOPICAL PRN
OUTPATIENT
Start: 2025-08-18

## 2025-08-18 RX ORDER — NEOMYCIN/BACITRACIN/POLYMYXINB 3.5-400-5K
OINTMENT (GRAM) TOPICAL PRN
OUTPATIENT
Start: 2025-08-18

## 2025-08-18 RX ORDER — GENTAMICIN SULFATE 1 MG/G
OINTMENT TOPICAL PRN
OUTPATIENT
Start: 2025-08-18

## 2025-08-18 RX ORDER — LIDOCAINE HYDROCHLORIDE 40 MG/ML
SOLUTION TOPICAL PRN
OUTPATIENT
Start: 2025-08-18

## 2025-08-18 RX ORDER — MUPIROCIN 2 %
OINTMENT (GRAM) TOPICAL PRN
OUTPATIENT
Start: 2025-08-18

## 2025-08-18 RX ORDER — LIDOCAINE 50 MG/G
OINTMENT TOPICAL PRN
OUTPATIENT
Start: 2025-08-18

## 2025-08-18 RX ORDER — BACITRACIN ZINC AND POLYMYXIN B SULFATE 500; 1000 [USP'U]/G; [USP'U]/G
OINTMENT TOPICAL PRN
OUTPATIENT
Start: 2025-08-18

## 2025-08-18 RX ORDER — BETAMETHASONE DIPROPIONATE 0.5 MG/G
CREAM TOPICAL PRN
OUTPATIENT
Start: 2025-08-18

## 2025-08-18 RX ORDER — TRIAMCINOLONE ACETONIDE 1 MG/G
OINTMENT TOPICAL PRN
OUTPATIENT
Start: 2025-08-18

## 2025-08-18 RX ORDER — SODIUM CHLOR/HYPOCHLOROUS ACID 0.033 %
SOLUTION, IRRIGATION IRRIGATION PRN
OUTPATIENT
Start: 2025-08-18

## 2025-08-18 RX ORDER — GINSENG 100 MG
CAPSULE ORAL PRN
OUTPATIENT
Start: 2025-08-18

## 2025-08-18 ASSESSMENT — PAIN SCALES - GENERAL: PAINLEVEL_OUTOF10: 8

## 2025-08-18 ASSESSMENT — PAIN DESCRIPTION - LOCATION: LOCATION: LEG

## 2025-08-18 ASSESSMENT — PAIN DESCRIPTION - ORIENTATION: ORIENTATION: LEFT

## 2025-08-18 ASSESSMENT — PAIN DESCRIPTION - DESCRIPTORS: DESCRIPTORS: BURNING
